# Patient Record
Sex: FEMALE | Race: WHITE | NOT HISPANIC OR LATINO | Employment: OTHER | URBAN - METROPOLITAN AREA
[De-identification: names, ages, dates, MRNs, and addresses within clinical notes are randomized per-mention and may not be internally consistent; named-entity substitution may affect disease eponyms.]

---

## 2017-03-07 ENCOUNTER — OFFICE VISIT (OUTPATIENT)
Dept: CARDIOLOGY | Facility: HOSPITAL | Age: 22
End: 2017-03-07

## 2017-03-07 ENCOUNTER — APPOINTMENT (OUTPATIENT)
Dept: CARDIOLOGY | Facility: HOSPITAL | Age: 22
End: 2017-03-07

## 2017-03-07 ENCOUNTER — PROCEDURE VISIT (OUTPATIENT)
Dept: CARDIOLOGY | Facility: HOSPITAL | Age: 22
End: 2017-03-07

## 2017-03-07 VITALS
HEART RATE: 86 BPM | WEIGHT: 98.6 LBS | SYSTOLIC BLOOD PRESSURE: 104 MMHG | RESPIRATION RATE: 18 BRPM | HEIGHT: 61 IN | TEMPERATURE: 98.4 F | DIASTOLIC BLOOD PRESSURE: 69 MMHG | BODY MASS INDEX: 18.61 KG/M2 | OXYGEN SATURATION: 100 %

## 2017-03-07 DIAGNOSIS — R00.2 PALPITATIONS: ICD-10-CM

## 2017-03-07 DIAGNOSIS — R55 SYNCOPE AND COLLAPSE: ICD-10-CM

## 2017-03-07 DIAGNOSIS — R00.2 PALPITATIONS: Primary | ICD-10-CM

## 2017-03-07 PROCEDURE — 93005 ELECTROCARDIOGRAM TRACING: CPT

## 2017-03-07 PROCEDURE — 99215 OFFICE O/P EST HI 40 MIN: CPT | Performed by: NURSE PRACTITIONER

## 2017-03-07 PROCEDURE — 93010 ELECTROCARDIOGRAM REPORT: CPT | Performed by: INTERNAL MEDICINE

## 2017-03-07 NOTE — PROGRESS NOTES
"  Encounter Date: 03/07/2017    Patient ID: Monae BURRELL is a 21 y.o. female    Chief Complaint: Establish Care (palps, SOB)     HPI :    This is a delightful white female who is self-referred to the clinic.  She comes in today relating a very long history of palpitations.  She has been evaluated at the [facility] and has also seen Dr. Astudillo for workup of her atrial fibrillation.  She has has been on beta blockers for 9 years.  She tells me that her last event monitor showed pauses greater than 4 seconds.  She has never been told that she has any arrhythmia.  There is no family history of sudden cardiac death or arrhythmias.    When the palpitation start basophilic a hard pounding that she can feel in her head and in her ears.  When the rate gets high (she does not clock the fast rate) she has chest pain with it.  It resolves once the rate goes down.  There are no precipitating or alleviating factors.  It occurs randomly.  Notably she has had 10 episodes of syncope where she has lost consciousness in the past 2 years.  Sometimes she checks her heart rate and it is slow as 34 bpm.  She feels very tired, dizzy and weak when her rate gets low.    She tell me she was diagnosed with mitral valve prolapse when she was younger but her last echocardiogram [facility] October 2016 was \"fine\".  She does have a history of asthma, otherwise is healthy.       Past Medical History   Diagnosis Date   • Asthma    • Insomnia    • Irregular heart beat          Past Surgical History   Procedure Laterality Date   • Tonsillectomy and adenoidectomy     • [place] tooth extraction     • Lymphadenectomy         Social History     Social History   • Marital status: Single     Spouse name: N/A   • Number of children: 0   • Years of education: N/A     Occupational History   • Not on file.     Social History Main Topics   • Smoking status: Never Smoker   • Smokeless tobacco: Never Used   • Alcohol use No   • Drug " use: No   • Sexual activity: Not on file     Other Topics Concern   • Not on file     Social History Narrative    Patient consumes 1 servings caffeine daily.     Patient lives at home with .            Family History   Problem Relation Age of Onset   • Thyroid disease Mother    • Coronary artery disease Father    • No Known Problems Brother    • Heart disease Maternal Grandmother    • Aortic aneurysm Maternal Grandfather    • Dementia Paternal Grandmother    • COPD Paternal Grandmother    • Heart disease Paternal Grandfather    • No Known Problems Brother        Review of Systems:  Review of Systems   Constitution: Positive for weakness and malaise/fatigue. Negative for chills, decreased appetite, diaphoresis, fever, night sweats, weight gain and weight loss.   HENT: Positive for headaches. Negative for congestion and nosebleeds.    Eyes: Negative for blurred vision, visual disturbance and visual halos.   Cardiovascular: Positive for chest pain, dyspnea on exertion, irregular heartbeat, leg swelling, near-syncope, orthopnea and palpitations. Negative for claudication, cyanosis, paroxysmal nocturnal dyspnea and syncope.   Respiratory: Positive for shortness of breath and wheezing. Negative for cough, hemoptysis, sleep disturbances due to breathing, snoring and sputum production.    Endocrine: Negative for cold intolerance, heat intolerance, polydipsia, polyphagia and polyuria.   Hematologic/Lymphatic: Bruises/bleeds easily.   Skin: Negative for dry skin, itching and rash.   Musculoskeletal: Positive for arthritis, falls, joint pain, joint swelling and muscle weakness. Negative for myalgias.   Gastrointestinal: Negative for bloating, abdominal pain, constipation, diarrhea, dysphagia, heartburn, melena, nausea and vomiting.   Genitourinary: Negative for dysuria, flank pain, hematuria and nocturia.   Neurological: Positive for difficulty with concentration, excessive daytime sleepiness, dizziness,  "light-headedness and loss of balance.   Psychiatric/Behavioral: Negative for altered mental status and depression. The patient has insomnia. The patient is not nervous/anxious.    Allergic/Immunologic: Positive for environmental allergies.       Objective:  Vitals:    03/07/17 1013 03/07/17 1014 03/07/17 1015   BP: 110/60 107/69 104/69   BP Location: Right arm Left arm Left arm   Patient Position: Sitting Sitting Standing   Pulse: 71  86   Resp: 18     Temp: 98.4 °F (36.9 °C)     TempSrc: Temporal Artery      SpO2: 100%     Weight: 98 lb 9.6 oz (44.7 kg)     Height: 61\" (154.9 cm)       Physical Exam   Constitutional: She is oriented to person, place, and time. She appears well-developed and well-nourished. No distress.   HENT:   Head: Normocephalic and atraumatic.   Mouth/Throat: Oropharynx is clear and moist. No oropharyngeal exudate.   Eyes: Conjunctivae are normal. Pupils are equal, round, and reactive to light. No scleral icterus.   Neck: No JVD present. No tracheal deviation present. No thyromegaly present.   Cardiovascular: Normal rate, regular rhythm, normal heart sounds and intact distal pulses.  Exam reveals no gallop and no friction rub.    No murmur heard.  Pulmonary/Chest: Effort normal and breath sounds normal. No respiratory distress. She has no wheezes. She has no rales.   Abdominal: Soft. Bowel sounds are normal. She exhibits no distension.   Musculoskeletal: She exhibits no edema.   Neurological: She is alert and oriented to person, place, and time.   Skin: Skin is warm and dry.   Psychiatric: She has a normal mood and affect.   :    Current Outpatient Prescriptions:   •  ALPRAZolam (XANAX) 0.25 MG tablet, TAKE 1-2 TABLETS BY MOUTH AT BEDTIME, Disp: , Rfl: 0  •  ALPRAZolam (XANAX) 0.5 MG tablet, Take 1 mg by mouth every night., Disp: , Rfl:   •  beclomethasone (QVAR) 80 MCG/ACT inhaler, Inhale 1 puff 2 (two) times a day., Disp: , Rfl:   •  bisoprolol (ZEBETA) 5 MG tablet, Take 5 mg by mouth daily., " Disp: , Rfl:   •  escitalopram (LEXAPRO) 5 MG tablet, Take 5 mg by mouth daily., Disp: , Rfl:   •  MethylPREDNISolone (MEDROL, EMMY,) 4 MG tablet, Take  by mouth 2 (two) times a day. Take as directed on package instructions., Disp: , Rfl:   •  montelukast (SINGULAIR) 10 MG tablet, Take 10 mg by mouth every night., Disp: , Rfl:   •  Norethin-Eth Estrad-Fe Biphas 1 MG-10 MCG / 10 MCG tablet, Take 1 tablet by mouth Daily., Disp: 28 tablet, Rfl: 12  •  QVAR 40 MCG/ACT inhaler, , Disp: , Rfl: 0     Lab/Diagnostic Studies:  EKG in the office shows sinus bradycardia with sinus arrhythmia.  Rate is 57 bpm; TX interval 132 ms; QRS duration 80 ms;  ms.  Final analysis is pending.    The patient had labs drawn at her PCPs office last week.  We have requested those records.      Please see media for scanned in event monitorsfrom 2014 in 2015    Assessment:    Palpitations dating back to 9 years ago    Syncope (10 times in 2 years):  -Brief review of event monitor from October 2014 shows at least 2 pauses.  The longest one I saw was 4.3 seconds.  I saw another one that was greater than 3 seconds.      Asthma, well-controlled     Plan:    -We will put a one-week ZI0 monitor on the patient  -We will obtain records from   -we will get her most recent labs from her PCP  -I have made a referral for her to see Dr. Go      Discussion:      *Please note that portions of this documentation may have been completed with a voice recognition program.  Efforts were made to edit this dictation, but occasional words may have been mistranscribed.

## 2017-03-10 ENCOUNTER — TELEPHONE (OUTPATIENT)
Dept: CARDIOLOGY | Facility: HOSPITAL | Age: 22
End: 2017-03-10

## 2017-03-10 NOTE — TELEPHONE ENCOUNTER
----- Message from DEVORA Newton sent at 3/10/2017  1:30 PM EST -----  Yes.  Please call her and tell her so.  ----- Message -----     From: Donna uBrns MA     Sent: 3/10/2017  11:23 AM       To: DEVORA Newton    Pt calling to see if you want her to stop taking her beta blocker, Zebeta, while wearing the monitor.

## 2017-03-15 DIAGNOSIS — I48.0 PAROXYSMAL ATRIAL FIBRILLATION (HCC): Primary | ICD-10-CM

## 2017-03-21 PROCEDURE — 93272 ECG/REVIEW INTERPRET ONLY: CPT | Performed by: INTERNAL MEDICINE

## 2017-03-27 ENCOUNTER — OFFICE VISIT (OUTPATIENT)
Dept: CARDIOLOGY | Facility: CLINIC | Age: 22
End: 2017-03-27

## 2017-03-27 DIAGNOSIS — R00.2 PALPITATIONS: ICD-10-CM

## 2017-05-03 ENCOUNTER — CONSULT (OUTPATIENT)
Dept: CARDIOLOGY | Facility: CLINIC | Age: 22
End: 2017-05-03

## 2017-05-03 VITALS
WEIGHT: 100 LBS | DIASTOLIC BLOOD PRESSURE: 64 MMHG | BODY MASS INDEX: 18.88 KG/M2 | HEART RATE: 87 BPM | SYSTOLIC BLOOD PRESSURE: 100 MMHG | HEIGHT: 61 IN

## 2017-05-03 DIAGNOSIS — R55 VASOVAGAL SYNCOPE: Primary | ICD-10-CM

## 2017-05-03 DIAGNOSIS — R00.2 PALPITATIONS: ICD-10-CM

## 2017-05-03 DIAGNOSIS — R00.0 SINUS TACHYCARDIA: ICD-10-CM

## 2017-05-03 PROCEDURE — 99204 OFFICE O/P NEW MOD 45 MIN: CPT | Performed by: INTERNAL MEDICINE

## 2017-05-03 PROCEDURE — 93000 ELECTROCARDIOGRAM COMPLETE: CPT | Performed by: INTERNAL MEDICINE

## 2017-05-03 RX ORDER — ALBUTEROL SULFATE 90 UG/1
2 AEROSOL, METERED RESPIRATORY (INHALATION) AS NEEDED
COMMUNITY

## 2017-05-03 RX ORDER — AMITRIPTYLINE HYDROCHLORIDE 10 MG/1
10 TABLET, FILM COATED ORAL 2 TIMES DAILY
Refills: 0 | COMMUNITY
Start: 2017-04-24 | End: 2021-10-01

## 2017-05-10 ENCOUNTER — APPOINTMENT (OUTPATIENT)
Dept: CARDIOLOGY | Facility: HOSPITAL | Age: 22
End: 2017-05-10
Attending: INTERNAL MEDICINE

## 2017-07-26 ENCOUNTER — OFFICE VISIT (OUTPATIENT)
Dept: CARDIOLOGY | Facility: CLINIC | Age: 22
End: 2017-07-26

## 2017-07-26 VITALS
HEIGHT: 61 IN | WEIGHT: 103.4 LBS | SYSTOLIC BLOOD PRESSURE: 106 MMHG | BODY MASS INDEX: 19.52 KG/M2 | DIASTOLIC BLOOD PRESSURE: 60 MMHG | HEART RATE: 72 BPM

## 2017-07-26 DIAGNOSIS — R55 VASOVAGAL SYNCOPE: Primary | ICD-10-CM

## 2017-07-26 DIAGNOSIS — R00.0 SINUS TACHYCARDIA: ICD-10-CM

## 2017-07-26 PROCEDURE — 99213 OFFICE O/P EST LOW 20 MIN: CPT | Performed by: INTERNAL MEDICINE

## 2017-07-26 RX ORDER — BISOPROLOL FUMARATE 5 MG/1
2.5 TABLET, FILM COATED ORAL 2 TIMES DAILY
Qty: 30 TABLET | Refills: 6 | Status: SHIPPED | OUTPATIENT
Start: 2017-07-26 | End: 2021-10-01

## 2017-07-26 NOTE — PROGRESS NOTES
Monae May  1995  [phone]      07/26/2017    [facility] CARDIOLOGY     A. Luis M Puckett II, MD MPH  [address]  [place] [id number]    Chief Complaint   Patient presents with   • Syncope       Problem List:   1)Syncope  2)Palpitations  A) [employer] Heart Monitor- 15 day monitoring period March 2017- . NSR with ST, no atrial fibrillation or SVT. Pause of 3.3 sec at   B) 10/31/2016 Echo: IVSD 0.65, LVPW 0.64, LA 2.4 cm EF>55%, mild MR, no MVP   2) Asthma   3) Insomnia  4) Surgical History- tonsillectomy and adenoidectomy, wisdom tooth extraction, lymphadenectomy     Allergies  Allergies   Allergen Reactions   • Ceftin [Cefuroxime Axetil] Nausea And Vomiting       Current Medications    Current Outpatient Prescriptions:   •  albuterol (VENTOLIN HFA) 108 (90 BASE) MCG/ACT inhaler, Inhale 2 puffs As Needed for Wheezing., Disp: , Rfl:   •  ALPRAZolam (XANAX) 0.25 MG tablet, TAKE 1-2 TABLETS BY MOUTH AT BEDTIME, Disp: , Rfl: 0  •  amitriptyline (ELAVIL) 10 MG tablet, Take 10 mg by mouth 2 (Two) Times a Day., Disp: , Rfl: 0  •  bisoprolol (ZEBETA) 5 MG tablet, Take 2.5 mg by mouth 2 (Two) Times a Day., Disp: , Rfl:   •  montelukast (SINGULAIR) 10 MG tablet, Take 10 mg by mouth every night., Disp: , Rfl:   •  Norethin-Eth Estrad-Fe Biphas 1 MG-10 MCG / 10 MCG tablet, Take 1 tablet by mouth Daily., Disp: 28 tablet, Rfl: 12  •  QVAR 40 MCG/ACT inhaler, Inhale 2 puffs Daily., Disp: , Rfl: 0    History of Present Illness   HPI    Pt presents for follow up of syncope. Since we last saw the pt, she did not have the tilt table test done due to inability to afford. She is having the same symptoms of blacking out several times, sometimes every other day, sometimes every other week. She has been compliant with hydrating and exercising. She has not passed all the way out, just blacks out momentarily. She has cut back her caffeine to one every other day which has not helped.  "She has not been checking her HRs or BP at home. She has tried Zoloft in the past, and it did not make a difference. She thinks her Elavil is not really helping her to fall asleep, but when she does fall asleep, she feels sleepy the rest of the day.     ROS:  General: -fatigue,+ weight gain - or loss  Cardiovascular:  Denies CP, PND,- syncope,+ near syncope, edema + palpitations.  Pulmonary:  Denies PERKINS, cough, or wheezing      Vitals:    07/26/17 1549   BP: 106/60   BP Location: Right arm   Patient Position: Sitting   Pulse: 72   Weight: 103 lb 6.4 oz (46.9 kg)   Height: 61\" (154.9 cm)     PE:  General: NAD  Neck: no JVD, no carotid bruits, no TM  Heart RRR, NL S1, S2, S4 present, no rubs, murmurs  Lungs: CTA, no wheezes, rhonchi, or rales  Abd: soft, non-tender, NL BS  Ext: No musculoskeletal deformities, no edema, cyanosis, or clubbing  Psych: normal mood and affect    Diagnostic Data:      Procedures    1. Vasovagal syncope    2. Sinus tachycardia          Plan:  1) Syncope- probably vaso-vagal. She could not afford Tilt table. She prefers to not increase her Elavil as it makes her tired. Did not have any benefit from Zoloft in the past. Will try to talk with her insurance company about covering the tilt table test in order to specify treatment. Consider [facility].   2. ST- controlled on Zebeta    Scribed for Zhou Go MD by Roxana Calderón PA-C. 7/26/2017  4:21 PM      I, Zhou Go MD, personally performed the services described in this documentation as scribed by the above named individual in my presence, and it is both accurate and complete.  7/26/2017  5:07 PM  "

## 2017-08-24 ENCOUNTER — TELEPHONE (OUTPATIENT)
Dept: CARDIOLOGY | Facility: CLINIC | Age: 22
End: 2017-08-24

## 2017-08-24 NOTE — TELEPHONE ENCOUNTER
Spoke with patient about starting Northera. Patient is going to research medication and call us back if she wants to start.

## 2017-08-24 NOTE — TELEPHONE ENCOUNTER
----- Message from Zhou Go MD sent at 8/14/2017  8:17 PM EDT -----  Regarding: FW: Visit Follow-Up Question  Contact: [phone]  It sounds like the next step would be northera.  Please arrange    GT  ----- Message -----     From: Tam Correia RN     Sent: 8/7/2017   2:42 PM       To: Zhou Go MD  Subject: FW: Visit Follow-Up Question                     Dr Go,    Patient is still unable to afford the cost of Tilt Table test. She reports that her symptoms have not improved. Patient is inquiring what you suggest she do next since she is not able to have test done.    Tam    ----- Message -----     From: Monae May     Sent: 8/3/2017  11:22 AM       To: [facility]  Subject: RE: Visit Follow-Up Question                     No ma'm, no improvement. Last time I saw him we didn't really go over much other than he wanted me to do the test.  ----- Message -----  From: AISHWARYA MANE  Sent: 8/2/2017 10:15 AM EDT  To: Monae May  Subject: RE: Visit Follow-Up Question    Monae,    I will inform Dr Go that you are unable to do the Tilt Table Test due to insurance/cost issues. How have your symptoms been? Any improvement since you saw him last?     AISHWARYA Turcios       ----- Message -----     From: Monae May     Sent: 8/1/2017  4:49 PM EDT       To: Zhou Go MD  Subject: Visit Follow-Up Question    I spoke with the scheduling department and my insurance and it will still cost me too much for me to be able to do the test. I did not schedule the test so I am not sure what you want me to do at this point.    Thank you,   Monae May   [phone]

## 2017-09-19 ENCOUNTER — OFFICE VISIT (OUTPATIENT)
Dept: RETAIL CLINIC | Facility: CLINIC | Age: 22
End: 2017-09-19

## 2017-09-19 VITALS
HEART RATE: 99 BPM | WEIGHT: 104 LBS | OXYGEN SATURATION: 99 % | RESPIRATION RATE: 20 BRPM | HEIGHT: 61 IN | BODY MASS INDEX: 19.63 KG/M2 | TEMPERATURE: 98.5 F

## 2017-09-19 DIAGNOSIS — J30.89 ALLERGIC RHINITIS DUE TO OTHER ALLERGIC TRIGGER: ICD-10-CM

## 2017-09-19 DIAGNOSIS — J01.90 ACUTE SINUSITIS, RECURRENCE NOT SPECIFIED, UNSPECIFIED LOCATION: Primary | ICD-10-CM

## 2017-09-19 PROCEDURE — 99213 OFFICE O/P EST LOW 20 MIN: CPT | Performed by: NURSE PRACTITIONER

## 2017-09-19 RX ORDER — AMOXICILLIN AND CLAVULANATE POTASSIUM 875; 125 MG/1; MG/1
1 TABLET, FILM COATED ORAL 2 TIMES DAILY
Qty: 14 TABLET | Refills: 0 | Status: SHIPPED | OUTPATIENT
Start: 2017-09-19 | End: 2017-09-22 | Stop reason: SINTOL

## 2017-09-19 NOTE — PROGRESS NOTES
Subjective   Monae May is a 22 y.o. female presents for possible sinus infection.  Patient states has ongoing allergies but thinks has turned to sinus infection.  States worse in last 2 days.  States has been taking xyzal and Ibuprofen with no relief.    Sinusitis   This is a new problem. The current episode started in the past 7 days. The problem has been rapidly worsening since onset. There has been no fever. Her pain is at a severity of 7/10. The pain is moderate. Associated symptoms include chills, congestion, coughing (dry cough), ear pain (bilat ear pressure, right > left), headaches, sinus pressure and a sore throat (mild). Pertinent negatives include no hoarse voice, neck pain or shortness of breath. Treatments tried: xyzal and ibuprofen. The treatment provided mild relief.        The following portions of the patient's history were reviewed and updated as appropriate: allergies, current medications, past family history, past medical history, past social history and past surgical history.    Review of Systems   Constitutional: Positive for chills and fever (states feels hot but has not cked temp). Negative for unexpected weight change.   HENT: Positive for congestion, ear pain (bilat ear pressure, right > left), postnasal drip, sinus pressure and sore throat (mild). Negative for hoarse voice, trouble swallowing and voice change.    Eyes: Negative.    Respiratory: Positive for cough (dry cough). Negative for chest tightness, shortness of breath and wheezing.    Cardiovascular: Negative.    Gastrointestinal: Negative.    Genitourinary: Negative.    Musculoskeletal: Negative.  Negative for neck pain.   Skin: Negative.    Allergic/Immunologic: Positive for environmental allergies.   Neurological: Positive for headaches.       Objective   Physical Exam   Constitutional: She is oriented to person, place, and time. She appears well-developed and well-nourished. No distress.   HENT:   Head: Normocephalic and  atraumatic.   Right Ear: Hearing and ear canal normal. No drainage or tenderness. Tympanic membrane is not erythematous. A middle ear effusion is present.   Left Ear: Hearing and ear canal normal. No drainage or tenderness. Tympanic membrane is not erythematous. A middle ear effusion is present.   Nose: Mucosal edema and sinus tenderness present. Right sinus exhibits maxillary sinus tenderness and frontal sinus tenderness. Left sinus exhibits maxillary sinus tenderness and frontal sinus tenderness.   Mouth/Throat: Uvula is midline and mucous membranes are normal. Posterior oropharyngeal erythema present. Tonsils are 0 on the right. Tonsils are 0 on the left. No tonsillar exudate.   Eyes: Conjunctivae and lids are normal. Pupils are equal, round, and reactive to light.   Neck: Normal range of motion.   Cardiovascular: Normal rate, regular rhythm and normal heart sounds.    No murmur heard.  Pulmonary/Chest: Effort normal and breath sounds normal. No respiratory distress. She has no wheezes.   Lymphadenopathy:     She has cervical adenopathy (bilateral anterior cervical).   Neurological: She is alert and oriented to person, place, and time.   Skin: Skin is warm and dry. No rash noted.   Psychiatric: She has a normal mood and affect. Her speech is normal and behavior is normal. Thought content normal. Cognition and memory are normal.       Assessment/Plan   Monae was seen today for sinusitis.    Diagnoses and all orders for this visit:    Acute sinusitis, recurrence not specified, unspecified location  Allergic rhinitis due to other allergic trigger  -     amoxicillin-clavulanate (AUGMENTIN) 875-125 MG per tablet; Take 1 tablet by mouth 2 (Two) Times a Day for 7 days.    Medications and plan of care reviewed with patient and teaching done on medication reactions/side effects. Continue Xyzal as prescribed and use saline sprays as discussed.  Rest, plenty of fluids, comfort measures, humidifier, warm salt water gargles,  and follow up with PCP if symptoms persist.  If worse go to urgent care or ER as discussed.  Patient verbalized understanding.     DEVORA Yao

## 2017-09-19 NOTE — PATIENT INSTRUCTIONS
Nasal Allergies  Nasal allergies are a reaction to allergens in the air. Allergens are particles in the air that cause your body to have an allergic reaction. Nasal allergies are not passed from person to person (are not contagious). They cannot be cured, but they can be controlled.  CAUSES  Seasonal nasal allergies (hay fever) are caused by pollen allergens that come from grasses, trees, and weeds. Year-round nasal allergies (perennial allergic rhinitis) are caused by allergens such as house dust mites, pet dander, and mold spores.  RISK FACTORS  The following factors may make you more likely to develop this condition:  · Having certain health conditions. These include:    Other types of allergies, such as food allergies.    Asthma.    Eczema.  · Having a close relative who has allergies or asthma.  · Exposure to house dust, pollen, dander, or other allergens at home or at work.  · Exposure to air pollution or secondhand smoke when you were a child.  SYMPTOMS  Symptoms of this condition include:  · Sneezing.  · Runny nose or stuffy nose (congestion).  · Watery (tearing) eyes.  · Itchy eyes, nose, mouth, throat, skin, or other area.  · Sore throat.  · Headache.  · Decreased sense of smell or taste.  · Fatigue. This may occur if you have trouble sleeping due to allergies.  · Swollen eyelids.  DIAGNOSIS  This condition is diagnosed with a medical history and physical exam. Allergy testing may be done to determine exactly what triggers your nasal allergies.  TREATMENT  There is no cure for nasal allergies. Treatment focuses on controlling your symptoms, and it may include:  · Medicines that block allergy symptoms. These may include allergy shots, nasal sprays, and oral antihistamines.  · Avoiding the allergen.  HOME CARE INSTRUCTIONS  · Avoid the allergen that is causing your symptoms, if possible.  · Keep windows closed. If possible, use air conditioning when pollen counts are high.  · Do not use fans in your  home.  · Do not hang clothes outside to dry.  · Wear sunglasses to keep pollen out of your eyes.  · Wash your hands right away after you touch household pets.  · Take over-the-counter and prescription medicines only as told by your health care provider.  · Keep all follow-up visits as told by your health care provider. This is important.  SEEK MEDICAL CARE IF:  · You have a fever.  · You develop a cough that does not go away (is persistent).  · You start to wheeze.  · Your symptoms do not improve with treatment.  · You have thick nasal discharge.  · You start to have nosebleeds.  SEEK IMMEDIATE MEDICAL CARE IF:  · Your tongue or your lips are swollen.  · You have trouble breathing.  · You feel light-headed or you feel like you are going to faint.  · You have cold sweats.     This information is not intended to replace advice given to you by your health care provider. Make sure you discuss any questions you have with your health care provider.     Document Released: 12/18/2006 Document Revised: 04/10/2017 Document Reviewed: 06/29/2016  [employer] Interactive Patient Education ©2017 [employer] Inc.  Sinusitis, Adult  Sinusitis is soreness and inflammation of your sinuses. Sinuses are hollow spaces in the bones around your face. They are located:  · Around your eyes.  · In the middle of your forehead.  · Behind your nose.  · In your cheekbones.  Your sinuses and nasal passages are lined with a stringy fluid (mucus). Mucus normally drains out of your sinuses. When your nasal tissues get inflamed or swollen, the mucus can get trapped or blocked so air cannot flow through your sinuses. This lets bacteria, viruses, and funguses grow, and that leads to infection.  HOME CARE  Medicines  · Take, use, or apply over-the-counter and prescription medicines only as told by your doctor. These may include nasal sprays.  · If you were prescribed an antibiotic medicine, take it as told by your doctor. Do not stop taking the antibiotic  even if you start to feel better.  Hydrate and Humidify  · Drink enough water to keep your pee (urine) clear or pale yellow.  · Use a cool mist humidifier to keep the humidity level in your home above 50%.  · Breathe in steam for 10-15 minutes, 3-4 times a day or as told by your doctor. You can do this in the bathroom while a hot shower is running.  · Try not to spend time in cool or dry air.  Rest  · Rest as much as possible.    · Sleep with your head raised (elevated).  · Make sure to get enough sleep each night.  General Instructions  · Put a warm, moist washcloth on your face 3-4 times a day or as told by your doctor. This will help with discomfort.  · Wash your hands often with soap and water. If there is no soap and water, use hand .  · Do not smoke. Avoid being around people who are smoking (secondhand smoke).  · Keep all follow-up visits as told by your doctor. This is important.  GET HELP IF:  · You have a fever.  · Your symptoms get worse.  · Your symptoms do not get better within 10 days.  GET HELP RIGHT AWAY IF:  · You have a very bad headache.  · You cannot stop throwing up (vomiting).  · You have pain or swelling around your face or eyes.  · You have trouble seeing.  · You feel confused.  · Your neck is stiff.  · You have trouble breathing.     This information is not intended to replace advice given to you by your health care provider. Make sure you discuss any questions you have with your health care provider.    Medications and plan of care reviewed with patient and teaching done on medication reactions/side effects. Continue Xyzal as prescribed and use saline sprays as discussed.  Rest, plenty of fluids, comfort measures, humidifier, warm salt water gargles, and follow up with PCP if symptoms persist.  If worse go to urgent care or ER as discussed.     Document Released: 06/05/2009 Document Revised: 04/10/2017 Document Reviewed: 10/12/2016  [employer] Interactive Patient Education ©2017  [employer] Inc.

## 2017-09-22 RX ORDER — DOXYCYCLINE 100 MG/1
100 TABLET ORAL 2 TIMES DAILY
Qty: 14 TABLET | Refills: 0 | Status: SHIPPED | OUTPATIENT
Start: 2017-09-22 | End: 2017-09-29

## 2017-09-22 NOTE — PROGRESS NOTES
Patient called and stated that Augmentin was causing her abdominal cramping and diarrhea. States diarrhea started last night.  States has had 2 episodes today. States took one dose of antibiotic today.  Requested different antibiotic.  Instructed to stop Augmentin.  Order for doxycycline sent in to pharmacy.  Teaching done with patient on side effects/med reactions.  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  Instructed to follow up with PCP if further or continued problems.  Patient verbalized understanding.    DEVORA Yao

## 2017-09-25 RX ORDER — NORETHINDRONE ACETATE AND ETHINYL ESTRADIOL AND FERROUS FUMARATE 1MG-20(24)
1 KIT ORAL DAILY
Qty: 28 TABLET | Refills: 12 | Status: SHIPPED | OUTPATIENT
Start: 2017-09-25 | End: 2018-08-01 | Stop reason: SDUPTHER

## 2017-09-26 ENCOUNTER — TELEPHONE (OUTPATIENT)
Dept: CARDIOLOGY | Facility: CLINIC | Age: 22
End: 2017-09-26

## 2017-09-26 NOTE — TELEPHONE ENCOUNTER
I tried to call the patient back. No answer. I left a message. She was supposed to call us back and let us know if she wanted to start the medication or not per the telephone encounter on 8/24/17. I also needed more information in regards to her email that she sent as well.

## 2017-09-27 ENCOUNTER — TELEPHONE (OUTPATIENT)
Dept: CARDIOLOGY | Facility: CLINIC | Age: 22
End: 2017-09-27

## 2017-09-28 ENCOUNTER — TELEPHONE (OUTPATIENT)
Dept: CARDIOLOGY | Facility: CLINIC | Age: 22
End: 2017-09-28

## 2017-09-28 NOTE — TELEPHONE ENCOUNTER
Called pt in regards to her email. Filled out Northera paperwork awaiting pt signature and [facility] signature to fax paperwork in to [employer].

## 2017-10-16 ENCOUNTER — TELEPHONE (OUTPATIENT)
Dept: OBSTETRICS AND GYNECOLOGY | Facility: CLINIC | Age: 22
End: 2017-10-16

## 2017-10-16 RX ORDER — FLUCONAZOLE 150 MG/1
150 TABLET ORAL DAILY
Qty: 1 TABLET | Refills: 1 | Status: SHIPPED | OUTPATIENT
Start: 2017-10-16 | End: 2018-08-01

## 2017-10-16 NOTE — TELEPHONE ENCOUNTER
Pt. Called complaining of having a yeast infection and would like Diflucan called in, it has previously worked in the past.

## 2017-10-30 ENCOUNTER — TELEPHONE (OUTPATIENT)
Dept: CARDIOLOGY | Facility: CLINIC | Age: 22
End: 2017-10-30

## 2017-10-30 NOTE — TELEPHONE ENCOUNTER
Patient just wanted to know if her insurance company had approved Northera yet. She said that she had already spoken with [employer] and they told her it had been approved. I explained to her that we have not received anything yet, but I will let her know when we do.

## 2017-12-08 ENCOUNTER — TELEPHONE (OUTPATIENT)
Dept: CARDIOLOGY | Facility: CLINIC | Age: 22
End: 2017-12-08

## 2017-12-08 NOTE — TELEPHONE ENCOUNTER
[facility] called to let us know we need to do PA on Northera by calling [phone].  I called and it was [facility], they stated that [facility] could not do the PA for us because they did not speak to 3rd parties. They asked for us to send in [facility] nonformulary medication request (which is already complete) and clinicals for Dr. Montes De Oca to review to [phone].  Pt was recently  and they reminded us that if so she would not be able to stay on her father's plan and that her insurance would likely change and we would have to repeat process.  I called and left message with pt to discuss if we want to try now or just go ahead and start in Jan.

## 2017-12-26 ENCOUNTER — DOCUMENTATION (OUTPATIENT)
Dept: CARDIOLOGY | Facility: CLINIC | Age: 22
End: 2017-12-26

## 2018-01-05 LAB — TOAL ENROLLMENT DAYS: 15

## 2018-02-15 ENCOUNTER — TELEPHONE (OUTPATIENT)
Dept: OBSTETRICS AND GYNECOLOGY | Facility: CLINIC | Age: 23
End: 2018-02-15

## 2018-02-15 RX ORDER — NORETHINDRONE ACETATE AND ETHINYL ESTRADIOL AND FERROUS FUMARATE 1MG-20(24)
1 KIT ORAL DAILY
Qty: 28 TABLET | Refills: 12 | Status: SHIPPED | OUTPATIENT
Start: 2018-02-15 | End: 2018-08-01 | Stop reason: SDUPTHER

## 2018-02-15 NOTE — TELEPHONE ENCOUNTER
A shouldn't called and a prescription for Loestrin 24 FE was sent to her pharmacy.    Dani Arango MD

## 2018-02-20 ENCOUNTER — CLINICAL SUPPORT (OUTPATIENT)
Dept: CARDIOLOGY | Facility: CLINIC | Age: 23
End: 2018-02-20

## 2018-02-20 ENCOUNTER — TELEPHONE (OUTPATIENT)
Dept: CARDIOLOGY | Facility: CLINIC | Age: 23
End: 2018-02-20

## 2018-02-20 DIAGNOSIS — R00.0 SINUS TACHYCARDIA: Primary | ICD-10-CM

## 2018-02-20 PROCEDURE — 93000 ELECTROCARDIOGRAM COMPLETE: CPT | Performed by: INTERNAL MEDICINE

## 2018-02-20 NOTE — TELEPHONE ENCOUNTER
Patient called to let us know that her heart rate feels like it may be irregular. She is coming in today for an EKG.She is currently on Northera. Her BP's have been 117/76, 123/74, 112/74 with HR's of 50-88.

## 2018-02-21 NOTE — PROGRESS NOTES
I came in to talk with pt, she was complaining of palpitations that happen a couple of hours before she is due to take her bisoprolol and states that it feels very irregular.  Dr. Go reviewed EKG, we will order 48 hours holter to check for any arrhythmias.

## 2018-03-27 ENCOUNTER — TELEPHONE (OUTPATIENT)
Dept: CARDIOLOGY | Facility: CLINIC | Age: 23
End: 2018-03-27

## 2018-03-28 RX ORDER — DROXIDOPA 300 MG/1
600 CAPSULE ORAL 3 TIMES DAILY
Qty: 90 CAPSULE
Start: 2018-03-28 | End: 2020-12-07

## 2018-03-28 NOTE — TELEPHONE ENCOUNTER
I spoke with pt, she does not think it was likely she was asleep at that time.  She is already on bisoprolol 2.5mg BID and is doing pretty well with the Northera at 600mg TID.  Do you want to change her BB dose?

## 2018-03-29 NOTE — TELEPHONE ENCOUNTER
I spoke with patient, she will decrease to 2.5mg qHS and will get a f/u scheduled, office scheduling notified.

## 2018-03-29 NOTE — TELEPHONE ENCOUNTER
I tried to call the patient. No answer. I left a message with this information.I instructed her to call us back to confirm that she received the message.

## 2018-08-01 ENCOUNTER — OFFICE VISIT (OUTPATIENT)
Dept: CARDIOLOGY | Facility: CLINIC | Age: 23
End: 2018-08-01

## 2018-08-01 VITALS
SYSTOLIC BLOOD PRESSURE: 110 MMHG | HEIGHT: 61 IN | WEIGHT: 100.2 LBS | HEART RATE: 99 BPM | DIASTOLIC BLOOD PRESSURE: 72 MMHG | BODY MASS INDEX: 18.92 KG/M2

## 2018-08-01 DIAGNOSIS — R55 VASOVAGAL SYNCOPE: Primary | ICD-10-CM

## 2018-08-01 DIAGNOSIS — R00.0 SINUS TACHYCARDIA: ICD-10-CM

## 2018-08-01 PROCEDURE — 99213 OFFICE O/P EST LOW 20 MIN: CPT | Performed by: INTERNAL MEDICINE

## 2019-02-25 ENCOUNTER — TELEPHONE (OUTPATIENT)
Dept: OBSTETRICS AND GYNECOLOGY | Facility: CLINIC | Age: 24
End: 2019-02-25

## 2019-02-25 RX ORDER — NORETHINDRONE ACETATE AND ETHINYL ESTRADIOL, ETHINYL ESTRADIOL AND FERROUS FUMARATE 1MG-10(24)
KIT ORAL
Qty: 168 TABLET | Refills: 0 | Status: SHIPPED | OUTPATIENT
Start: 2019-02-25 | End: 2019-02-25 | Stop reason: SDUPTHER

## 2019-02-25 NOTE — TELEPHONE ENCOUNTER
PATIENT NEEDS REFILL ON LO LOESTRIN PLEASE CALL INTO [facility] ON [address]. COULD BE L[facility]ED [facility]. CALL HER [phone] WITH ANY QUESTIONS.

## 2019-03-21 ENCOUNTER — OFFICE VISIT (OUTPATIENT)
Dept: OBSTETRICS AND GYNECOLOGY | Facility: CLINIC | Age: 24
End: 2019-03-21

## 2019-03-21 VITALS — SYSTOLIC BLOOD PRESSURE: 96 MMHG | BODY MASS INDEX: 20.44 KG/M2 | WEIGHT: 108.2 LBS | DIASTOLIC BLOOD PRESSURE: 60 MMHG

## 2019-03-21 DIAGNOSIS — Z01.419 WOMEN'S ANNUAL ROUTINE GYNECOLOGICAL EXAMINATION: Primary | ICD-10-CM

## 2019-03-21 DIAGNOSIS — Z30.41 ENCOUNTER FOR BIRTH CONTROL PILLS MAINTENANCE: ICD-10-CM

## 2019-03-21 PROCEDURE — 99395 PREV VISIT EST AGE 18-39: CPT | Performed by: OBSTETRICS & GYNECOLOGY

## 2019-03-21 RX ORDER — NORETHINDRONE ACETATE AND ETHINYL ESTRADIOL 1; .02 MG/1; MG/1
TABLET ORAL
COMMUNITY
End: 2019-03-21 | Stop reason: SDUPTHER

## 2019-03-21 NOTE — PROGRESS NOTES
Subjective   Chief Complaint   Patient presents with   • Annual Exam     no c/o     Monae May is a 23 y.o. year old No obstetric history on file. presenting to be seen for her annual exam.  Patient is on lo Loestrin birth control pills and wants refills for a year.  Her cycles are regular and she is having no problems.  The patient has no other GYN problems.    SEXUAL Hx:  She is currently sexually active.  In the past year there has not been new sexual partners.    Condoms are not typically used.  She would not like to be screened for STD's at today's exam.  Current birth control method: OCP (estrogen/progesterone).  She is happy with her current method of contraception and does not want to discuss alternative methods of contraception.  MENSTRUAL Hx:  Patient's last menstrual period was 02/21/2019 (within days).  In the past 6 months her cycles have been regular, predictable and occur monthly.  Her menstrual flow is typically light.   Each month on average there are roughly 1 day(s) of very heavy flow.    Intermenstrual bleeding is absent.    Post-coital bleeding is absent.  Dysmenorrhea: is not affecting her activities of daily living  PMS: is not affecting her activities of daily living  Her cycles are not a source of concern for her that she wishes to discuss today.  HEALTH Hx:  She exercises regularly:yes.  She wears her seat belt:yes.  She has concerns about domestic violence: no.  OTHER COMPLAINTS:  Nothing else    The following portions of the patient's history were reviewed and updated as appropriate:problem list, current medications, allergies, past family history, past medical history, past social history and past surgical history.    Social History    Tobacco Use      Smoking status: Never Smoker      Smokeless tobacco: Never Used    Review of Systems  Review of Systems - History obtained from the patient  General ROS: negative  Psychological ROS: negative  ENT ROS: negative  Allergy and Immunology  ROS: positive for - seasonal allergies  Hematological and Lymphatic ROS: negative  Endocrine ROS: negative  Breast ROS: negative for breast lumps  Respiratory ROS: no cough, shortness of breath, or wheezing  Cardiovascular ROS: positive for - irregular heartbeat  Gastrointestinal ROS: no abdominal pain, change in bowel habits, or black or bloody stools  Genito-Urinary ROS: no dysuria, trouble voiding, or hematuria  Musculoskeletal ROS: negative  Neurological ROS: no TIA or stroke symptoms  Dermatological ROS: negative        Objective   BP 96/60 (BP Location: Right arm, Patient Position: Sitting, Cuff Size: Adult)   Wt 49.1 kg (108 lb 3.2 oz)   LMP 02/21/2019 (Within Days)   Breastfeeding? No   BMI 20.44 kg/m²      General:  well developed; well nourished  no acute distress   Skin:  No suspicious lesions seen   Thyroid: normal to inspection and palpation   Breasts:  Examined in supine position  Symmetric without masses or skin dimpling  Nipples normal without inversion, lesions or discharge  There are no palpable axillary nodes   Abdomen: soft, non-tender; no masses  no umbilical or inguinal hernias are present  no hepato-splenomegaly   Heart: regular rate and rhythm, S1, S2 normal, no murmur, click, rub or gallop   Lungs: clear to auscultation   Pelvis: Clinical staff was present for exam  External genitalia:  normal appearance of the external genitalia including Bartholin's and [place]'s glands.  :  urethral meatus normal;  Vaginal:  normal pink mucosa without prolapse or lesions.  Cervix:  normal appearance. Pap smear was done  Uterus:  normal size, shape and consistency.  Adnexa:  normal bimanual exam of the adnexa.  Rectal:  digital rectal exam not performed; anus visually normal appearing.          Assessment/Plan   Monae was seen today for annual exam.    Diagnoses and all orders for this visit:    Women's annual routine gynecological examination  -     Liquid-based Pap Smear, Screening    Encounter for  birth control pills maintenance  -     Norethin-Eth Estrad-Fe Biphas (LO LOESTRIN FE) 1 MG-10 MCG / 10 MCG tablet; Take 1 tablet by mouth Daily.         Return in 1 year    This note was electronically signed.    Dani Arango MD   March 21, 2019

## 2019-07-29 ENCOUNTER — TELEPHONE (OUTPATIENT)
Dept: OBSTETRICS AND GYNECOLOGY | Facility: CLINIC | Age: 24
End: 2019-07-29

## 2019-07-29 DIAGNOSIS — Z30.41 ENCOUNTER FOR BIRTH CONTROL PILLS MAINTENANCE: ICD-10-CM

## 2019-07-29 NOTE — TELEPHONE ENCOUNTER
Dr. Arango patient:    Patient insurance requires 90-day refill on birth control not monthly. Needs 90-day refill prescription wrote.    New pharmacy is [facility] on [address].    Callback number is: [phone]

## 2020-04-14 ENCOUNTER — TELEPHONE (OUTPATIENT)
Dept: OBSTETRICS AND GYNECOLOGY | Facility: CLINIC | Age: 25
End: 2020-04-14

## 2020-04-14 DIAGNOSIS — Z30.41 ENCOUNTER FOR BIRTH CONTROL PILLS MAINTENANCE: ICD-10-CM

## 2020-04-14 NOTE — TELEPHONE ENCOUNTER
Patient requesting a refill on her birth control Lo Loestrin. Patient will need an appointment in June/July 2020. I will send in enough refills until her appointment. E-Rx Lo Loestrin was sent to patient's pharmacy [facility] on [address]

## 2020-04-14 NOTE — TELEPHONE ENCOUNTER
PATIENT CALLED TO REQUEST A REFILL ON HER BIRTH CONTROL, BERNIE MIGUEL TO BE CALLED IN TO THE [facility] ON [address].

## 2020-09-30 DIAGNOSIS — Z30.41 ENCOUNTER FOR BIRTH CONTROL PILLS MAINTENANCE: ICD-10-CM

## 2020-09-30 RX ORDER — NORETHINDRONE ACETATE AND ETHINYL ESTRADIOL, ETHINYL ESTRADIOL AND FERROUS FUMARATE 1MG-10(24)
KIT ORAL
Qty: 28 TABLET | Refills: 0 | Status: SHIPPED | OUTPATIENT
Start: 2020-09-30 | End: 2020-10-28

## 2020-09-30 NOTE — TELEPHONE ENCOUNTER
Last seen 3/21/2019 and per Dr. Arango's office note patient was to return in 1 year. I will send in one refill ad advise patient she will need an appointment for additional refills.

## 2020-10-28 DIAGNOSIS — Z30.41 ENCOUNTER FOR BIRTH CONTROL PILLS MAINTENANCE: ICD-10-CM

## 2020-10-28 RX ORDER — NORETHINDRONE ACETATE AND ETHINYL ESTRADIOL, ETHINYL ESTRADIOL AND FERROUS FUMARATE 1MG-10(24)
KIT ORAL
Qty: 28 TABLET | Refills: 1 | Status: SHIPPED | OUTPATIENT
Start: 2020-10-28 | End: 2020-11-30 | Stop reason: SDUPTHER

## 2020-11-30 DIAGNOSIS — Z30.41 ENCOUNTER FOR BIRTH CONTROL PILLS MAINTENANCE: ICD-10-CM

## 2020-11-30 RX ORDER — NORETHINDRONE ACETATE AND ETHINYL ESTRADIOL, ETHINYL ESTRADIOL AND FERROUS FUMARATE 1MG-10(24)
1 KIT ORAL DAILY
Qty: 84 TABLET | Refills: 0 | Status: SHIPPED | OUTPATIENT
Start: 2020-11-30 | End: 2020-12-07 | Stop reason: SDUPTHER

## 2020-11-30 NOTE — TELEPHONE ENCOUNTER
[phone] returned patient's call and advised patient a 30 day supply was sent to her pharmacy because she has an appointment 12/7/2020 and at the time of her appointment Dr. Arango will send in a 90 day supply with refills for a year. Patient states her pharmacy [facility] advised her that her insurance will not cover her Rx unless it's a 90 day supply. I advised patient I will go ahead and send over a 90 day supply and for her to make sure she keeps her appointment on 12/7/2020. Patient verbalized understanding. I sent a Rx for Lo Loestrin #84 no refills to [facility] on [address].

## 2020-12-07 ENCOUNTER — OFFICE VISIT (OUTPATIENT)
Dept: OBSTETRICS AND GYNECOLOGY | Facility: CLINIC | Age: 25
End: 2020-12-07

## 2020-12-07 VITALS — BODY MASS INDEX: 22.11 KG/M2 | WEIGHT: 117 LBS

## 2020-12-07 DIAGNOSIS — Z01.419 WELL WOMAN EXAM WITH ROUTINE GYNECOLOGICAL EXAM: Primary | ICD-10-CM

## 2020-12-07 DIAGNOSIS — Z30.41 ENCOUNTER FOR BIRTH CONTROL PILLS MAINTENANCE: ICD-10-CM

## 2020-12-07 PROCEDURE — 99395 PREV VISIT EST AGE 18-39: CPT | Performed by: OBSTETRICS & GYNECOLOGY

## 2020-12-07 RX ORDER — NORETHINDRONE ACETATE AND ETHINYL ESTRADIOL, ETHINYL ESTRADIOL AND FERROUS FUMARATE 1MG-10(24)
1 KIT ORAL DAILY
Qty: 84 TABLET | Refills: 12 | Status: SHIPPED | OUTPATIENT
Start: 2020-12-07 | End: 2021-12-10

## 2020-12-07 RX ORDER — TRAZODONE HYDROCHLORIDE 50 MG/1
TABLET ORAL
COMMUNITY
End: 2021-10-01

## 2020-12-07 NOTE — PROGRESS NOTES
Subjective   Chief Complaint   Patient presents with   • Gynecologic Exam     Patient is here today for her annual and pap smear, no complaints     Monae aMy is a 25 y.o. year old  presenting to be seen for her annual exam.  Patient presents for an annual exam to continue on birth control pills.  She had a Pap smear last year which was normal and wants to do this on a 3-year basis.  She is having no GYN problems.   Adult Visits - 19 to 39 Years - Counseling/Anticipatory Guidance: Nutrition, family planning/contraception, physical activity, healthy weight, injury prevention, misuse of tobacco, alcohol and drugs, sexual behavior and STDs, dental health, mental health, immunizations, screenings For Women: Breast cancer and self breast exams    SEXUAL Hx:  She is currently sexually active.  In the past year there has not been new sexual partners.    Condoms are not typically used.  She would not like to be screened for STD's at today's exam.  Current birth control method: OCP (estrogen/progesterone).  She is happy with her current method of contraception and does not want to discuss alternative methods of contraception.  MENSTRUAL Hx:  Patient's last menstrual period was 2020 (approximate).  In the past 6 months her cycles have been irregular.  Her menstrual flow is typically light.   Each month on average there are roughly 0 day(s) of very heavy flow.    Intermenstrual bleeding is absent.    Post-coital bleeding is absent.  Dysmenorrhea: none  PMS: none  Her cycles are not a source of concern for her that she wishes to discuss today.  HEALTH Hx:  She exercises regularly:no (and has no plans to become more active).  She wears her seat belt:yes.  She has concerns about domestic violence: no.  OTHER COMPLAINTS:  Nothing else    The following portions of the patient's history were reviewed and updated as appropriate:problem list, current medications, allergies, past family history, past medical history,  past social history and past surgical history.    Social History    Tobacco Use      Smoking status: Never Smoker      Smokeless tobacco: Never Used    Review of Systems  Review of Systems - History obtained from the patient  General ROS: negative  Psychological ROS: negative  ENT ROS: negative  Allergy and Immunology ROS: positive for - seasonal allergies  Hematological and Lymphatic ROS: negative  Endocrine ROS: negative  Breast ROS: negative for breast lumps  Respiratory ROS: no cough, shortness of breath, or wheezing  Cardiovascular ROS: positive for - irregular heartbeat  Gastrointestinal ROS: no abdominal pain, change in bowel habits, or black or bloody stools  Genito-Urinary ROS: no dysuria, trouble voiding, or hematuria  Musculoskeletal ROS: negative  Neurological ROS: no TIA or stroke symptoms  Dermatological ROS: negative        Objective   Wt 53.1 kg (117 lb)   LMP 11/23/2020 (Approximate)   Breastfeeding No   BMI 22.11 kg/m²     General:  well developed; well nourished  no acute distress   Skin:  No suspicious lesions seen   Thyroid: not examined   Breasts:  Examined in supine position  Symmetric without masses or skin dimpling  Nipples normal without inversion, lesions or discharge  There are no palpable axillary nodes   Abdomen: soft, non-tender; no masses  no umbilical or inguinal hernias are present  no hepato-splenomegaly   Heart: regular rate and rhythm, S1, S2 normal, no murmur, click, rub or gallop   Lungs: clear to auscultation   Pelvis: Clinical staff was present for exam  External genitalia:  normal appearance of the external genitalia including Bartholin's and [place]'s glands.  :  urethral meatus normal;  Vaginal:  normal pink mucosa without prolapse or lesions.  Cervix:  normal appearance.  Uterus:  normal size, shape and consistency. anteverted;  Adnexa:  normal bimanual exam of the adnexa.  Rectal:  digital rectal exam not performed; anus visually normal appearing.           Assessment/Plan   Diagnoses and all orders for this visit:    1. Well woman exam with routine gynecological exam (Primary)    2. Encounter for birth control pills maintenance  -     Norethin-Eth Estrad-Fe Biphas (Lo Loestrin Fe) 1 MG-10 MCG / 10 MCG tablet; Take 1 tablet by mouth Daily.  Dispense: 84 tablet; Refill: 12    Other orders  -     Cancel: Pap IG, Rfx HPV ASCU; Future    Return in 1 year       This note was electronically signed.    Dani Arango MD   December 7, 2020

## 2021-09-20 ENCOUNTER — HOSPITAL ENCOUNTER (OUTPATIENT)
Dept: GENERAL RADIOLOGY | Facility: HOSPITAL | Age: 26
Discharge: HOME | End: 2021-09-20
Admitting: NURSE PRACTITIONER

## 2021-09-20 ENCOUNTER — TRANSCRIBE ORDERS (OUTPATIENT)
Dept: ADMINISTRATIVE | Facility: HOSPITAL | Age: 26
End: 2021-09-20

## 2021-09-20 DIAGNOSIS — R07.89 CHEST PAIN, MID STERNAL: Primary | ICD-10-CM

## 2021-09-20 DIAGNOSIS — R10.84 ABDOMINAL PAIN, GENERALIZED: ICD-10-CM

## 2021-09-20 PROCEDURE — 74019 RADEX ABDOMEN 2 VIEWS: CPT

## 2021-09-20 PROCEDURE — 71046 X-RAY EXAM CHEST 2 VIEWS: CPT

## 2021-09-21 ENCOUNTER — TRANSCRIBE ORDERS (OUTPATIENT)
Dept: ADMINISTRATIVE | Facility: HOSPITAL | Age: 26
End: 2021-09-21

## 2021-09-21 DIAGNOSIS — G44.52 NEW DAILY PERSISTENT HEADACHE: Primary | ICD-10-CM

## 2021-09-29 ENCOUNTER — TELEPHONE (OUTPATIENT)
Dept: GASTROENTEROLOGY | Facility: CLINIC | Age: 26
End: 2021-09-29

## 2021-09-29 NOTE — TELEPHONE ENCOUNTER
I tried to call patient back. No answer; left voice message for patient to call us back.  Patient left voice message wondering what can she do until her appointment. Unfortunately I can't give any medical advice or recommendation until she is an established patient. I will route message to Jennifer.

## 2021-10-01 ENCOUNTER — OFFICE VISIT (OUTPATIENT)
Dept: NEUROLOGY | Facility: CLINIC | Age: 26
End: 2021-10-01

## 2021-10-01 VITALS
DIASTOLIC BLOOD PRESSURE: 66 MMHG | OXYGEN SATURATION: 99 % | HEART RATE: 58 BPM | HEIGHT: 62 IN | TEMPERATURE: 97.3 F | WEIGHT: 115 LBS | BODY MASS INDEX: 21.16 KG/M2 | SYSTOLIC BLOOD PRESSURE: 110 MMHG

## 2021-10-01 DIAGNOSIS — G44.209 TENSION-TYPE HEADACHE, NOT INTRACTABLE, UNSPECIFIED CHRONICITY PATTERN: Primary | ICD-10-CM

## 2021-10-01 PROCEDURE — 99214 OFFICE O/P EST MOD 30 MIN: CPT | Performed by: NURSE PRACTITIONER

## 2021-10-01 NOTE — PROGRESS NOTES
Headache New Office Visit      Encounter Date: 10/01/2021   Patient Name: Monae May  : 1995   MRN: [id number]   PCP: Dr Mike Payton  Referring Provider:   Chief Complaint:    Chief Complaint   Patient presents with   • Headache       History of Present Illness: Monae May is a 26 y.o. female who is here today with her father for evaluation of headaches.     Has a remote history of migraines, last more than one year ago presents with new daily, persistent headache that started in July.    Headache Symptoms:   Days per month: Daily. Starts 30 min after waking  Location: bilat temple and Frontal       Quality: Aching and Pressure   Sharp pain now resolved.    Duration: 2 hours to all day. Would wake her from sleep.  Severity: 310  Triggers: none  Associated Symptoms: Nausea, Scent sensitivity , Dizziness, Tinnitus and ring. Denies whooshing sound. Worse with laying down or bending over.  Aura: none  Visual Changes: no new changes.     Not taking NSAIDS on a daily basis. 2 propel and 2 water bottles/day.    Abortives: Tylenol, Imitrex, Aleve 220mg, Excedrin migraine  Preventives: bisoprolol fumarate                            History of migraines. Last was over a year ago.                                                  Has neurocardiogenic syncope.    Meds of note: Xanax, lo estrin, Corlanor    Cannot tolerate IV's of any kind and requests MRI be changed to without only. She has LOC with shaking and procedures are always cancelled.  Subjective      Past Medical History:   Past Medical History:   Diagnosis Date   • Asthma    • Chicken pox    • Insomnia    • Neurocardiogenic syncope 5/3/2017       Past Surgical History:   Past Surgical History:   Procedure Laterality Date   • ADENOIDECTOMY     • LYMPHADENECTOMY     • TONSILLECTOMY AND ADENOIDECTOMY     • WISDOM TOOTH EXTRACTION         Family History:   Family History   Problem Relation Age of Onset   • Thyroid disease Mother    • Coronary artery  disease Father 57        mI @ 57   • No Known Problems Brother    • Heart disease Maternal Grandmother    • Aortic aneurysm Maternal Grandfather    • Dementia Paternal Grandmother    • COPD Paternal Grandmother    • Heart disease Paternal Grandfather    • Kidney nephrosis Brother        Social History:   Social History     Socioeconomic History   • Marital status:      Spouse name: Not on file   • Number of children: 0   • Years of education: Not on file   • Highest education level: Not on file   Tobacco Use   • Smoking status: Never Smoker   • Smokeless tobacco: Never Used   Vaping Use   • Vaping Use: Never used   Substance and Sexual Activity   • Alcohol use: No   • Drug use: No   • Sexual activity: Yes     Birth control/protection: Pill, OCP       Medications:     Current Outpatient Medications:   •  albuterol (VENTOLIN HFA) 108 (90 BASE) MCG/ACT inhaler, Inhale 2 puffs As Needed for Wheezing., Disp: , Rfl:   •  ALPRAZolam (XANAX) 0.25 MG tablet, TAKE 1-2 TABLETS BY MOUTH AT BEDTIME, Disp: , Rfl: 0  •  beclomethasone (QVAR) 40 MCG/ACT inhaler, , Disp: , Rfl:   •  ivabradine HCl (Corlanor) 7.5 MG tablet tablet, Every 12 (Twelve) Hours., Disp: , Rfl:   •  Norethin-Eth Estrad-Fe Biphas (Lo Loestrin Fe) 1 MG-10 MCG / 10 MCG tablet, Take 1 tablet by mouth Daily., Disp: 84 tablet, Rfl: 12    Allergies:   Allergies   Allergen Reactions   • Ceftin [Cefuroxime Axetil] Nausea And Vomiting   • Cefdinir Nausea Only and Other (See Comments)       PHQ-9 Total Score:     STEADI Fall Risk Assessment has not been completed.    Objective     Physical Exam:   Physical Exam  Eyes:      Pupils: Pupils are equal, round, and reactive to light.   Neurological:      Mental Status: She is oriented to person, place, and time.      Coordination: Finger-Nose-Finger Test, Heel to Shin Test and Romberg Test normal.      Gait: Gait is intact.      Deep Tendon Reflexes:      Reflex Scores:       Tricep reflexes are 2+ on the right side  and 2+ on the left side.       Bicep reflexes are 2+ on the right side and 2+ on the left side.       Brachioradialis reflexes are 2+ on the right side and 2+ on the left side.       Patellar reflexes are 2+ on the right side and 2+ on the left side.       Achilles reflexes are 2+ on the right side and 2+ on the left side.  Psychiatric:         Speech: Speech normal.         Neurologic Exam     Mental Status   Oriented to person, place, and time.   Follows 3 step commands.   Attention: normal. Concentration: normal.   Speech: speech is normal   Level of consciousness: alert  Knowledge: consistent with education.   Normal comprehension.     Cranial Nerves     CN III, IV, VI   Pupils are equal, round, and reactive to light.  Right pupil: Accommodation: intact.   Left pupil: Accommodation: intact.   CN III: no CN III palsy  CN VI: no CN VI palsy  Nystagmus: none   Diplopia: none  Upgaze: normal  Downgaze: normal  Conjugate gaze: present    CN VII   Facial expression full, symmetric.     CN VIII   Hearing: intact    CN XII   CN XII normal.     Motor Exam   Muscle bulk: normal  Overall muscle tone: normal    Strength   Right biceps: 5/5  Left biceps: 5/5  Right triceps: 5/5  Left triceps: 5/5  Right interossei: 5/5  Left interossei: 5/5  Right quadriceps: 5/5  Left quadriceps: 5/5  Right anterior tibial: 5/5  Left anterior tibial: 5/5  Right posterior tibial: 5/5  Left posterior tibial: 5/5    Sensory Exam   Light touch normal.     Gait, Coordination, and Reflexes     Gait  Gait: normal    Coordination   Romberg: negative  Finger to nose coordination: normal  Heel to shin coordination: normal    Tremor   Resting tremor: absent  Action tremor: absent    Reflexes   Right brachioradialis: 2+  Left brachioradialis: 2+  Right biceps: 2+  Left biceps: 2+  Right triceps: 2+  Left triceps: 2+  Right patellar: 2+  Left patellar: 2+  Right achilles: 2+  Left achilles: 2+  Right : 2+  Left : 2+       Vital Signs:   Vitals:  "   10/01/21 1308   BP: 110/66   Pulse: 58   Temp: 97.3 °F (36.3 °C)   SpO2: 99%   Weight: 52.2 kg (115 lb)   Height: 157.5 cm (62\")     Body mass index is 21.03 kg/m².         Assessment / Plan      Assessment/Plan:   Diagnoses and all orders for this visit:    1. Tension-type headache, not intractable, unspecified chronicity pattern (Primary)  Comments:  Samples of Nurtec to try.  Orders:  -     MRI Brain With Contrast; Future           Patient Education:   I have discussed with the patient today the causes and overview of headaches. We discussed the different types of headaches to include tension-type headaches, Migraine headaches and Cluster headaches. We also discussed when headaches could or would be of a more serious condition such as brain infection, inflammation or bleeding within or around the brain. When to seek immediate medical attention or call 911.     Reviewed medications, potential side effects and signs and symptoms to report. Discussed risk versus benefits of treatment plan with patient and/or family-including medications, labs and radiology that may be ordered. Addressed questions and concerns during visit. Patient and/or family verbalized understanding and agree with plan. Instructed to call the office with any questions and report to ER with any life-threatening symptoms.     Follow Up:   After MRI.    During this visit the following were done:  Labs Reviewed []    Labs Ordered []    Radiology Reports Reviewed []    Radiology Ordered [x]    PCP Records Reviewed [x]    Referring Provider Records Reviewed []    ER Records Reviewed []    Hospital Records Reviewed []    History Obtained From Family []    Radiology Images Reviewed []    Other Reviewed [x]     Records Requested []      Diane Cummings, KB, APRN  "

## 2021-10-07 DIAGNOSIS — G44.209 TENSION-TYPE HEADACHE, NOT INTRACTABLE, UNSPECIFIED CHRONICITY PATTERN: Primary | ICD-10-CM

## 2021-10-07 RX ORDER — AMITRIPTYLINE HYDROCHLORIDE 10 MG/1
10 TABLET, FILM COATED ORAL NIGHTLY
Qty: 30 TABLET | Refills: 2 | Status: SHIPPED | OUTPATIENT
Start: 2021-10-07 | End: 2022-08-24 | Stop reason: ALTCHOICE

## 2021-10-12 ENCOUNTER — TELEPHONE (OUTPATIENT)
Dept: NEUROLOGY | Facility: CLINIC | Age: 26
End: 2021-10-12

## 2021-10-12 ENCOUNTER — APPOINTMENT (OUTPATIENT)
Dept: MRI IMAGING | Facility: HOSPITAL | Age: 26
End: 2021-10-12

## 2021-10-12 ENCOUNTER — OFFICE VISIT (OUTPATIENT)
Dept: GASTROENTEROLOGY | Facility: CLINIC | Age: 26
End: 2021-10-12

## 2021-10-12 VITALS
DIASTOLIC BLOOD PRESSURE: 78 MMHG | SYSTOLIC BLOOD PRESSURE: 132 MMHG | HEIGHT: 62 IN | WEIGHT: 117.8 LBS | HEART RATE: 78 BPM | TEMPERATURE: 97.1 F | OXYGEN SATURATION: 98 % | BODY MASS INDEX: 21.68 KG/M2

## 2021-10-12 DIAGNOSIS — G44.209 TENSION-TYPE HEADACHE, NOT INTRACTABLE, UNSPECIFIED CHRONICITY PATTERN: Primary | ICD-10-CM

## 2021-10-12 DIAGNOSIS — K20.80 PILL ESOPHAGITIS: Primary | ICD-10-CM

## 2021-10-12 DIAGNOSIS — T50.905A PILL ESOPHAGITIS: Primary | ICD-10-CM

## 2021-10-12 DIAGNOSIS — R19.7 DIARRHEA, UNSPECIFIED TYPE: ICD-10-CM

## 2021-10-12 PROCEDURE — 99213 OFFICE O/P EST LOW 20 MIN: CPT | Performed by: NURSE PRACTITIONER

## 2021-10-12 RX ORDER — TRAZODONE HYDROCHLORIDE 50 MG/1
25 TABLET ORAL NIGHTLY
COMMUNITY

## 2021-10-12 RX ORDER — SUCRALFATE 1 G/1
TABLET ORAL
COMMUNITY
Start: 2021-09-20

## 2021-10-12 RX ORDER — TOPIRAMATE 25 MG/1
TABLET ORAL
Qty: 120 TABLET | Refills: 0 | Status: SHIPPED | OUTPATIENT
Start: 2021-10-12 | End: 2021-10-14 | Stop reason: SDUPTHER

## 2021-10-12 NOTE — PROGRESS NOTES
New Patient Consultation     Patient Name: Monae May  : 1995   MRN: [id number]     Chief Complaint:    Chief Complaint   Patient presents with   • Heartburn   • Diarrhea     cramps, abdominal pain, bloating        History of Present Illness: Monae May is a 26 y.o. female who is here today for a Gastroenterology Consultation for GERD, Diarrhea.    Symptoms began about 4 weeks ago after a round of abx (doxycline ) for a sinus infection.  After starting the doxycycline she began having epigastric pain, heartburn, abdominal cramps and bloating.  Her symptoms self improved but returned after second round of abx (z-pack).  She is having some diarrhea and the stool is yellow.  She is having a BM about 1-2 times a day.  She has not had any stool studies.  Her symptoms had been getting better for about a week but the symptoms returned yesterday.  Nexium and carafate were helpful for the chest pain/epigastric pain. There is no fever or chills. There was some weight loss as it was painful to eat initially.  She does have some esophageal dysphagia.  She has had anxiety surrounding this.  She is taking kefir which may be helping.      XR Abdomen Flat & Upright (2021 14:50)    Subjective      Review of Systems:   Review of Systems   Constitutional: Negative for appetite change and unexpected weight loss.   HENT: Negative for trouble swallowing.    Gastrointestinal: Positive for abdominal distention, abdominal pain, diarrhea, nausea, GERD and indigestion. Negative for anal bleeding, blood in stool, constipation, rectal pain and vomiting.       Past Medical History:   Past Medical History:   Diagnosis Date   • Asthma    • Chicken pox    • Insomnia    • Neurocardiogenic syncope 5/3/2017       Past Surgical History:   Past Surgical History:   Procedure Laterality Date   • ADENOIDECTOMY     • LYMPHADENECTOMY     • TONSILLECTOMY AND ADENOIDECTOMY     • WISDOM TOOTH EXTRACTION         Family History:   Family  History   Problem Relation Age of Onset   • Thyroid disease Mother    • Coronary artery disease Father 57        mI @ 57   • No Known Problems Brother    • Heart disease Maternal Grandmother    • Aortic aneurysm Maternal Grandfather    • Dementia Paternal Grandmother    • COPD Paternal Grandmother    • Heart disease Paternal Grandfather    • Kidney nephrosis Brother    • Colon cancer Neg Hx    • Colon polyps Neg Hx    • Esophageal cancer Neg Hx        Social History:   Social History     Socioeconomic History   • Marital status:    • Number of children: 0   Tobacco Use   • Smoking status: Never Smoker   • Smokeless tobacco: Never Used   Vaping Use   • Vaping Use: Never used   Substance and Sexual Activity   • Alcohol use: No   • Drug use: No   • Sexual activity: Yes     Birth control/protection: Pill, OCP       Alcohol/Tobacco History:   Social History     Substance and Sexual Activity   Alcohol Use No     Social History     Tobacco Use   Smoking Status Never Smoker   Smokeless Tobacco Never Used       Medications:     Current Outpatient Medications:   •  albuterol (VENTOLIN HFA) 108 (90 BASE) MCG/ACT inhaler, Inhale 2 puffs As Needed for Wheezing., Disp: , Rfl:   •  ALPRAZolam (XANAX) 0.25 MG tablet, TAKE 1-2 TABLETS BY MOUTH AT BEDTIME, Disp: , Rfl: 0  •  beclomethasone (QVAR) 40 MCG/ACT inhaler, , Disp: , Rfl:   •  esomeprazole (nexIUM) 20 MG capsule, Take 20 mg by mouth Every Morning Before Breakfast., Disp: , Rfl:   •  ivabradine HCl (Corlanor) 7.5 MG tablet tablet, Every 12 (Twelve) Hours., Disp: , Rfl:   •  Norethin-Eth Estrad-Fe Biphas (Lo Loestrin Fe) 1 MG-10 MCG / 10 MCG tablet, Take 1 tablet by mouth Daily., Disp: 84 tablet, Rfl: 12  •  traZODone (DESYREL) 50 MG tablet, Take 25 mg by mouth Every Night., Disp: , Rfl:   •  amitriptyline (ELAVIL) 10 MG tablet, Take 1 tablet by mouth Every Night., Disp: 30 tablet, Rfl: 2  •  sucralfate (CARAFATE) 1 g tablet, , Disp: , Rfl:   •  topiramate (Topamax) 25  "MG tablet, 1 bid x 1 week, then 2 bid x 2 weeks, Disp: 120 tablet, Rfl: 0    Allergies:   Allergies   Allergen Reactions   • Amoxicillin Other (See Comments)   • Ceftin [Cefuroxime Axetil] Nausea And Vomiting   • Doxycycline Other (See Comments)   • Cefdinir Nausea Only and Other (See Comments)       Objective     Physical Exam:  Vital Signs:   Vitals:    10/12/21 1007   BP: 132/78   BP Location: Left arm   Patient Position: Sitting   Cuff Size: Adult   Pulse: 78   Temp: 97.1 °F (36.2 °C)   TempSrc: Temporal   SpO2: 98%   Weight: 53.4 kg (117 lb 12.8 oz)   Height: 157.5 cm (62.01\")     Body mass index is 21.54 kg/m².     Physical Exam  Vitals and nursing note reviewed.   Constitutional:       General: She is not in acute distress.     Appearance: She is well-developed. She is not diaphoretic.   Eyes:      General: No scleral icterus.     Extraocular Movements:      Right eye: No nystagmus.      Left eye: No nystagmus.      Conjunctiva/sclera: Conjunctivae normal.      Pupils: Pupils are equal, round, and reactive to light.   Neck:      Thyroid: No thyromegaly.   Cardiovascular:      Rate and Rhythm: Normal rate and regular rhythm.   Pulmonary:      Effort: Pulmonary effort is normal.      Breath sounds: Normal breath sounds.   Abdominal:      General: Bowel sounds are normal. There is no distension. There are no signs of injury.      Palpations: Abdomen is soft. There is no hepatomegaly or splenomegaly.      Tenderness: There is abdominal tenderness in the epigastric area. There is no guarding or rebound. Negative signs include Senior's sign.      Hernia: No hernia is present.      Comments: Mild epigastric tenderness   Musculoskeletal:      Cervical back: Neck supple.      Right lower leg: No edema.      Left lower leg: No edema.   Skin:     General: Skin is warm and dry.      Capillary Refill: Capillary refill takes 2 to 3 seconds.      Coloration: Skin is not jaundiced or pale.      Findings: No bruising or " petechiae.      Nails: There is no clubbing.   Neurological:      Mental Status: She is alert and oriented to person, place, and time.   Psychiatric:         Behavior: Behavior normal.         Thought Content: Thought content normal.         Judgment: Judgment normal.       Viewed refer provider office note  Assessment / Plan      Assessment/Plan:   Diagnoses and all orders for this visit:    1. Pill esophagitis (Primary)  -     H. Pylori Antigen, Stool - Stool, Per Rectum; Future    2. Diarrhea, unspecified type  -     Gastrointestinal Panel, PCR - Stool, Per Rectum; Future  -     Clostridium Difficile Toxin, PCR - Stool, Per Rectum; Future  -     H. Pylori Antigen, Stool - Stool, Per Rectum; Future       Likely secondary doxycycline.  Continue PPI for 6 weeks then may discontinue.  Test stool for infection, continue Arturo.  Advance diet as tolerated    Follow Up:   Return in about 6 weeks (around 11/23/2021), or if symptoms worsen or fail to improve.    Plan of care reviewed with the patient at the conclusion of today's visit.  Education was provided regarding diagnosis, management, and any prescribed or recommended OTC medications.  Patient verbalized understanding of and agreement with management plan.     Time Statement:   Discussed plan of care in detail with patient today. Patient verbally understands and agrees. I have spent 20 minutes reviewing available diagnostics, obtaining history, examining the patient, developing a treatment plan, and educating the patient on disease process and plan of care.    DEVORA Bradley  [facility]

## 2021-10-12 NOTE — TELEPHONE ENCOUNTER
I called to discuss HA meds. Mailbox was full and unable to leave a message. Sent her a pt message and requested she call me back or schedule a video visit to discuss.

## 2021-10-12 NOTE — TELEPHONE ENCOUNTER
Monae called back. She was on Elavil 50mg for 10 years. Would like to try something else for her migraine.  Will send in TPM.

## 2021-10-13 ENCOUNTER — LAB (OUTPATIENT)
Dept: LAB | Facility: HOSPITAL | Age: 26
End: 2021-10-13

## 2021-10-13 DIAGNOSIS — K20.80 PILL ESOPHAGITIS: ICD-10-CM

## 2021-10-13 DIAGNOSIS — R19.7 DIARRHEA, UNSPECIFIED TYPE: ICD-10-CM

## 2021-10-13 DIAGNOSIS — T50.905A PILL ESOPHAGITIS: ICD-10-CM

## 2021-10-13 LAB
ADV 40+41 DNA STL QL NAA+NON-PROBE: NOT DETECTED
ASTRO TYP 1-8 RNA STL QL NAA+NON-PROBE: NOT DETECTED
C CAYETANENSIS DNA STL QL NAA+NON-PROBE: NOT DETECTED
C COLI+JEJ+UPSA DNA STL QL NAA+NON-PROBE: NOT DETECTED
C DIFF TOX GENS STL QL NAA+PROBE: NOT DETECTED
CRYPTOSP DNA STL QL NAA+NON-PROBE: NOT DETECTED
E HISTOLYT DNA STL QL NAA+NON-PROBE: NOT DETECTED
EAEC PAA PLAS AGGR+AATA ST NAA+NON-PRB: NOT DETECTED
EC STX1+STX2 GENES STL QL NAA+NON-PROBE: NOT DETECTED
EPEC EAE GENE STL QL NAA+NON-PROBE: NOT DETECTED
ETEC LTA+ST1A+ST1B TOX ST NAA+NON-PROBE: NOT DETECTED
G LAMBLIA DNA STL QL NAA+NON-PROBE: NOT DETECTED
NOROVIRUS GI+II RNA STL QL NAA+NON-PROBE: NOT DETECTED
P SHIGELLOIDES DNA STL QL NAA+NON-PROBE: NOT DETECTED
RVA RNA STL QL NAA+NON-PROBE: NOT DETECTED
S ENT+BONG DNA STL QL NAA+NON-PROBE: NOT DETECTED
SAPO I+II+IV+V RNA STL QL NAA+NON-PROBE: NOT DETECTED
SHIGELLA SP+EIEC IPAH ST NAA+NON-PROBE: NOT DETECTED
V CHOL+PARA+VUL DNA STL QL NAA+NON-PROBE: NOT DETECTED
V CHOLERAE DNA STL QL NAA+NON-PROBE: NOT DETECTED
Y ENTEROCOL DNA STL QL NAA+NON-PROBE: NOT DETECTED

## 2021-10-13 PROCEDURE — 87493 C DIFF AMPLIFIED PROBE: CPT

## 2021-10-13 PROCEDURE — 0097U HC BIOFIRE FILMARRAY GI PANEL: CPT

## 2021-10-13 PROCEDURE — 87338 HPYLORI STOOL AG IA: CPT

## 2021-10-14 DIAGNOSIS — G44.209 TENSION-TYPE HEADACHE, NOT INTRACTABLE, UNSPECIFIED CHRONICITY PATTERN: ICD-10-CM

## 2021-10-14 RX ORDER — TOPIRAMATE 25 MG/1
TABLET ORAL
Qty: 360 TABLET | Refills: 1 | Status: SHIPPED | OUTPATIENT
Start: 2021-10-14

## 2021-10-14 NOTE — TELEPHONE ENCOUNTER
Rx Refill Note  Requested Prescriptions     Pending Prescriptions Disp Refills   • topiramate (Topamax) 25 MG tablet 360 tablet 1     Si bid x 1 week, then 2 bid      This was sent in on 10/12/21, however insurance will only cover as a 90 day supply. Done. Thanks!    Last office visit with prescribing clinician: 10/1/2021      Next office visit with prescribing clinician: 2021

## 2021-10-17 LAB — H PYLORI AG STL QL IA: NEGATIVE

## 2021-10-19 ENCOUNTER — TELEPHONE (OUTPATIENT)
Dept: NEUROLOGY | Facility: CLINIC | Age: 26
End: 2021-10-19

## 2021-10-19 NOTE — TELEPHONE ENCOUNTER
----- Message from Monae May sent at 10/19/2021 10:10 AM EDT -----  Regarding: Headaches  I didn't realize until after I saw you that I was experiencing really bad anxiety. I am now working with my doctor to get that under control and have been placed on Prozac. Also, the ringing in my ears has subsided but now is more of a flutter. Since I am still having the headaches on my temples, forehead, nose, ears, in between and under eyes, cheeks and tooth pain (went to the dentist and teeth are fine) do you think it's possible that it is just a combination of anxiety and sinus inflammation? [place] do currently [place] neighborhood and only 3 houses down from the new are that is being excavated and full of ragweed. Should I wait and see if once I get my anxiety under control and we have our first frost if they go away and then if not start the Topomax? Thank you!

## 2021-12-10 ENCOUNTER — OFFICE VISIT (OUTPATIENT)
Dept: OBSTETRICS AND GYNECOLOGY | Facility: CLINIC | Age: 26
End: 2021-12-10

## 2021-12-10 VITALS
BODY MASS INDEX: 22.31 KG/M2 | WEIGHT: 121.2 LBS | HEIGHT: 62 IN | RESPIRATION RATE: 14 BRPM | SYSTOLIC BLOOD PRESSURE: 104 MMHG | DIASTOLIC BLOOD PRESSURE: 64 MMHG

## 2021-12-10 DIAGNOSIS — Z30.40 ENCOUNTER FOR REFILL OF PRESCRIPTION FOR CONTRACEPTION: ICD-10-CM

## 2021-12-10 DIAGNOSIS — Z01.419 WELL WOMAN EXAM WITH ROUTINE GYNECOLOGICAL EXAM: Primary | ICD-10-CM

## 2021-12-10 DIAGNOSIS — G43.109 MIGRAINE WITH AURA AND WITHOUT STATUS MIGRAINOSUS, NOT INTRACTABLE: ICD-10-CM

## 2021-12-10 PROCEDURE — 99395 PREV VISIT EST AGE 18-39: CPT | Performed by: STUDENT IN AN ORGANIZED HEALTH CARE EDUCATION/TRAINING PROGRAM

## 2021-12-10 RX ORDER — DROSPIRENONE 4 MG/1
1 TABLET, FILM COATED ORAL DAILY
Qty: 28 TABLET | Refills: 12 | Status: SHIPPED | OUTPATIENT
Start: 2021-12-10 | End: 2022-12-13

## 2021-12-10 NOTE — PROGRESS NOTES
"Subjective   Chief Complaint   Patient presents with   • Gynecologic Exam     No complaints     Monae May is a 26 y.o. year old  presenting to be seen for her annual exam. She is doing well and has no complaints.     Last pap 2019: NILM.  Due .    SEXUAL Hx:  She is currently sexually active.  In the past year there there has been NO new sexual partners.    Condoms are never used.  She would not like to be screened for STD's at today's exam.  Current birth control method: OCP (estrogen/progesterone)   She is not happy with her current method of contraception and does want to discuss alternative methods of contraception.  MENSTRUAL Hx:  No LMP recorded.  In the past 6 months her cycles have been regular, predictable and occur monthly.  Her menstrual flow is typically light.   Each month on average there are roughly 0 day(s) of very heavy flow.  Intermenstrual bleeding is absent.    Post-coital bleeding is absent.  Dysmenorrhea: none  PMS: none and is not affecting her activities of daily living  Her cycles ARE a source of concern for her that she wishes to discuss today.  HEALTH Hx:  She exercises regularly: yes.  She wears her seat belt: yes.  She has concerns about domestic violence: no.  OTHER THINGS SHE WANTS TO DISCUSS TODAY:  Nothing else    The following portions of the patient's history were reviewed and updated as appropriate:problem list, current medications, allergies, past family history, past medical history, past social history and past surgical history.    Social History    Tobacco Use      Smoking status: Never Smoker      Smokeless tobacco: Never Used         Objective   /64 (BP Location: Right arm, Patient Position: Sitting, Cuff Size: Adult)   Resp 14   Ht 157.5 cm (62\")   Wt 55 kg (121 lb 3.2 oz)   Breastfeeding No   BMI 22.17 kg/m²     General:  well developed; well nourished  no acute distress   Skin:  No suspicious lesions seen   Thyroid: not examined   Breasts:  " Examined in supine position  Symmetric without masses or skin dimpling  Nipples normal without inversion, lesions or discharge  There are no palpable axillary nodes   Abdomen: soft, non-tender; no masses  no umbilical or inguinal hernias are present   Pelvis: Clinical staff was present for exam  External genitalia:  normal appearance of the external genitalia including Bartholin's and Dorothy's glands.  :  urethral meatus normal;  Vaginal:  normal pink mucosa without prolapse or lesions.  Cervix:  normal appearance.  Uterus:  normal size, shape and consistency.  Adnexa:  normal bimanual exam of the adnexa.  Rectal:  digital rectal exam not performed; anus visually normal appearing.        Assessment   1. Normal GYN exam  2. Migraine with intermittent aura   3. Contraception counseling   4. She is up to date on all relevant gynecologic screenings     Plan   1. Pap was not done today.  I explained to Monae that the recommendations for Pap smear interval in a low risk patient has lengthened to 3 years time.  I told Monae she still needs to be seen in our office yearly for a full physical including breast and pelvic exam.  2. Due to patient's history of migraines with aura, contraception changed from combined OCP to progesterone only OCP.  Prescription for Slynd 4 mg daily sent to patient pharmacy.   3. I discussed with Monae that she may be behind on needed vaccinations for Influenza and COVID.  She may be able to obtain these vaccinations at her local pharmacy OR speak about obtaining them with her primary care.  If she does obtain her vaccines, I have asked Monae to let us know the date each vaccine was obtained so that her medical record could be updated in our system.  4. The importance of keeping all planned follow-up and taking all medications as prescribed was emphasized.  5. Follow up for annual exam in 1 year    No orders of the defined types were placed in this encounter.         This note was electronically  signed.    April Lanier MD   December 10, 2021

## 2022-02-07 ENCOUNTER — TELEPHONE (OUTPATIENT)
Dept: OBSTETRICS AND GYNECOLOGY | Facility: CLINIC | Age: 27
End: 2022-02-07

## 2022-02-07 RX ORDER — ACETAMINOPHEN AND CODEINE PHOSPHATE 120; 12 MG/5ML; MG/5ML
1 SOLUTION ORAL DAILY
Qty: 28 TABLET | Refills: 12 | Status: SHIPPED | OUTPATIENT
Start: 2022-02-07 | End: 2022-12-13 | Stop reason: SDUPTHER

## 2022-02-07 NOTE — TELEPHONE ENCOUNTER
Dr. Lanier's patient is wanting to switch birth control due to side effects: hot flashes, hair shedding, anxious, and emotional.

## 2022-02-07 NOTE — TELEPHONE ENCOUNTER
[phone] called patient to let her know Dr. Lanier has sent in a different OCP for her. Patient verbalized understanding.

## 2022-02-07 NOTE — TELEPHONE ENCOUNTER
Patient called and stated that her birth control Slynd is not working for her, she wants to know if she can be switched back to her other birth control, her number is [phone]

## 2022-08-23 ENCOUNTER — TELEPHONE (OUTPATIENT)
Dept: OBSTETRICS AND GYNECOLOGY | Facility: CLINIC | Age: 27
End: 2022-08-23

## 2022-08-23 NOTE — TELEPHONE ENCOUNTER
Patient called, stating she has been experiencing white discharge, itchiness, burning and a sour smell. Patient states this has been going on for about 2-3 weeks. Patient wants to know if something can be called into [facility] or if she should come in. Please give patient a call

## 2022-08-24 ENCOUNTER — OFFICE VISIT (OUTPATIENT)
Dept: OBSTETRICS AND GYNECOLOGY | Facility: CLINIC | Age: 27
End: 2022-08-24

## 2022-08-24 VITALS
SYSTOLIC BLOOD PRESSURE: 100 MMHG | BODY MASS INDEX: 22.26 KG/M2 | RESPIRATION RATE: 14 BRPM | HEIGHT: 62 IN | WEIGHT: 121 LBS | DIASTOLIC BLOOD PRESSURE: 62 MMHG

## 2022-08-24 DIAGNOSIS — B37.31 YEAST VAGINITIS: Primary | ICD-10-CM

## 2022-08-24 PROCEDURE — 99213 OFFICE O/P EST LOW 20 MIN: CPT | Performed by: STUDENT IN AN ORGANIZED HEALTH CARE EDUCATION/TRAINING PROGRAM

## 2022-08-24 RX ORDER — ESCITALOPRAM OXALATE 10 MG/1
TABLET ORAL
COMMUNITY

## 2022-08-24 RX ORDER — FLUCONAZOLE 150 MG/1
150 TABLET ORAL DAILY
Qty: 2 TABLET | Refills: 0 | Status: SHIPPED | OUTPATIENT
Start: 2022-08-24 | End: 2022-08-26

## 2022-08-24 RX ORDER — BISOPROLOL FUMARATE 5 MG/1
TABLET, FILM COATED ORAL
COMMUNITY

## 2022-08-24 NOTE — PROGRESS NOTES
"Subjective   Chief Complaint   Patient presents with   • Vaginitis     Has a yeast infection that will not go away; white vaginal discharge with itching.     Monae May is a 27 y.o. year old .  No LMP recorded (lmp unknown).  She presents for persistent yeast infection of the last 2 to 3 weeks, with white vaginal discharge and itching. Patient reports her PCP gave her a one time dose of Diflucan last Tuesday that somewhat helped but the symptoms. Denies any vaginal bleeding. Denies any new sexual partners recently.     The following portions of the patient's history were reviewed and updated as appropriate:current medications, allergies and past medical history    Social History    Tobacco Use      Smoking status: Never Smoker      Smokeless tobacco: Never Used         Objective   /62 (BP Location: Right arm, Patient Position: Sitting, Cuff Size: Adult)   Resp 14   Ht 157.5 cm (62\")   Wt 54.9 kg (121 lb)   LMP  (LMP Unknown)   Breastfeeding No   BMI 22.13 kg/m²     General:  well developed; well nourished  no acute distress   Lungs:  breathing is unlabored   Heart:  Not performed.   Pelvis: Clinical staff was present for exam  External genitalia:  normal appearance of the external genitalia including Bartholin's and [place]'s glands.  :  urethral meatus normal;  Vaginal:  normal pink mucosa without prolapse or lesions. discharge present -  white;  Cervix:  normal appearance.     Lab Review   No data reviewed    Imaging   No data reviewed        Assessment   1. Yeast vaginitis      Plan   1. Leukorrhea swab collected.  Will treat with 2 dose regimen of Diflucan, 1 dose to be taken today and another in 72 hours.  2. Will call patient with any additional abnormal results, and adjust treatment.  Patient to call if symptoms not resolved in 1 week, and will add on 1 week of vaginal Monistat.  3. The importance of keeping all planned follow-up and taking all medications as prescribed was " emphasized.  4. Follow up for annual exam as scheduled 12/2022.     No orders of the defined types were placed in this encounter.         This note was electronically signed.    April Lanier MD   August 24, 2022

## 2022-12-13 ENCOUNTER — OFFICE VISIT (OUTPATIENT)
Dept: OBSTETRICS AND GYNECOLOGY | Facility: CLINIC | Age: 27
End: 2022-12-13

## 2022-12-13 VITALS
HEIGHT: 62 IN | WEIGHT: 122.4 LBS | DIASTOLIC BLOOD PRESSURE: 68 MMHG | SYSTOLIC BLOOD PRESSURE: 102 MMHG | BODY MASS INDEX: 22.52 KG/M2 | RESPIRATION RATE: 14 BRPM

## 2022-12-13 DIAGNOSIS — Z01.419 WOMEN'S ANNUAL ROUTINE GYNECOLOGICAL EXAMINATION: Primary | ICD-10-CM

## 2022-12-13 DIAGNOSIS — Z01.419 PAP TEST, AS PART OF ROUTINE GYNECOLOGICAL EXAMINATION: ICD-10-CM

## 2022-12-13 DIAGNOSIS — Z30.40 ENCOUNTER FOR REFILL OF PRESCRIPTION FOR CONTRACEPTION: ICD-10-CM

## 2022-12-13 PROCEDURE — 99395 PREV VISIT EST AGE 18-39: CPT | Performed by: STUDENT IN AN ORGANIZED HEALTH CARE EDUCATION/TRAINING PROGRAM

## 2022-12-13 RX ORDER — ACETAMINOPHEN AND CODEINE PHOSPHATE 120; 12 MG/5ML; MG/5ML
1 SOLUTION ORAL DAILY
Qty: 90 TABLET | Refills: 3 | Status: SHIPPED | OUTPATIENT
Start: 2022-12-13 | End: 2023-12-13

## 2022-12-13 NOTE — PROGRESS NOTES
"Subjective   Chief Complaint   Patient presents with   • Annual Exam     No complaints     Monae May is a 27 y.o. year old  presenting to be seen for her annual exam. She is doing well today and has no complaints. She needs a refill on her POPs.     SEXUAL Hx:  She is currently sexually active.  In the past year there there has been NO new sexual partners.    She would not like to be screened for STD's at today's exam.  Current birth control method: OCP (progestin only).  She is happy with her current method of contraception and does not want to discuss alternative methods of contraception.  MENSTRUAL Hx:  No LMP recorded (lmp unknown). (Menstrual status: Oral contraceptives).  In the past 6 months her cycles have been absent.    Intermenstrual bleeding is absent.    Post-coital bleeding is absent.  Dysmenorrhea: none  PMS: none  Her cycles are not a source of concern for her that she wishes to discuss today.  HEALTH Hx:  She exercises regularly: yes. Walk and lift weights.   She wears her seat belt: yes.  She has concerns about domestic violence: no.    The following portions of the patient's history were reviewed and updated as appropriate:problem list, current medications, allergies, past family history, past medical history, past social history and past surgical history.    Social History    Tobacco Use      Smoking status: Never      Smokeless tobacco: Never         Objective   /68 (BP Location: Right arm, Patient Position: Sitting, Cuff Size: Adult)   Resp 14   Ht 157.5 cm (62\")   Wt 55.5 kg (122 lb 6.4 oz)   LMP  (LMP Unknown)   Breastfeeding No   BMI 22.39 kg/m²     General:  well developed; well nourished  no acute distress   Skin:  No suspicious lesions seen   Breasts:  Examined in supine position  Symmetric without masses or skin dimpling  Nipples normal without inversion, lesions or discharge  There are no palpable axillary nodes  Fibrocystic changes are present both breasts " without a discrete mass   Pelvis: Clinical staff was present for exam  External genitalia:  normal appearance of the external genitalia including Bartholin's and Chenoweth's glands.  :  urethral meatus normal;  Vaginal:  normal pink mucosa without prolapse or lesions.  Cervix:  normal appearance. ectropian present;  Uterus:  normal size, shape and consistency.  Adnexa:  normal bimanual exam of the adnexa.        Assessment   1. Normal annual GYN exam   2. Contraception refill     Plan   1. Pap was done today.  If she does not receive the results of the Pap within 2 weeks  time, she was instructed to call to find out the results.  I explained to Monae that the recommendations for Pap smear interval in a low risk patient's has lengthened to 3 years time.  I encouraged her to be seen yearly for a full physical exam including breast and pelvic exam even during the off years when PAP's will not be performed.  2. Continue POPs.  90-day supply with 1 year refill sent to patient pharmacy.   3. The importance of keeping all planned follow-up and taking all medications as prescribed was emphasized.  4. Follow up for annual exam in 1 year or sooner PRN     New Medications Ordered This Visit   Medications   • norethindrone (MICRONOR) 0.35 MG tablet     Sig: Take 1 tablet by mouth Daily.     Dispense:  90 tablet     Refill:  3          This note was electronically signed.    April Lanier MD   December 13, 2022

## 2022-12-14 LAB — REF LAB TEST METHOD: NORMAL

## 2023-06-05 ENCOUNTER — PATIENT MESSAGE (OUTPATIENT)
Dept: OBSTETRICS AND GYNECOLOGY | Facility: CLINIC | Age: 28
End: 2023-06-05
Payer: COMMERCIAL

## 2023-06-06 RX ORDER — NORETHINDRONE ACETATE AND ETHINYL ESTRADIOL 1MG-20(21)
1 KIT ORAL DAILY
Qty: 28 TABLET | Refills: 12 | Status: SHIPPED | OUTPATIENT
Start: 2023-06-06 | End: 2024-06-05

## 2023-06-06 NOTE — TELEPHONE ENCOUNTER
Ms. May is taking the progesterone only pill that does not have a placebo pill, she is not a smoker and has no history of blood clots and would like to try a combo Estrogen/Progesterone birth control pill

## 2023-06-06 NOTE — TELEPHONE ENCOUNTER
From: Monae Hernandezed  To: April High  Sent: 6/5/2023 2:42 PM EDT  Subject: Birth Control    My birth control was preventing my periods but the last two months I have had really bad and long periods. Is there anything I can do to prevent them? Thank you!

## 2023-08-14 NOTE — TELEPHONE ENCOUNTER
Internal Medicine Progress Note     Patient: Cheo Giron  MRN: [id number]  YOB: 1945, 77 year old, male    Admission Date: 8/8/2023  Current Hospital Day: Hospital Day: 7  Attending: Zafar Chapman MD  PCP: Skyler Mesa MD    Resident Team: YELLOW     Chief Complaint   Patient presents with   • Shortness of Breath        Code Status: Full Resuscitation    Patient Summary     Collateral information from nursing home documentation, patient,and ED admission.      Cheo Giron is a 77 year old male with a PMHx of asthma, COPD, HTN, CAD, Afib (not on AC), medronic CKD, MI s/p CABG (1993, follows Dr. Philip gamez), s/p dual chamber pacemaker/ICD (successful interogation 7/7/23), recent hip fracture status post revision (7/7/2023), chronic indwelling sears who presented to ED on 8/8/2023 from [facility] and shortness of breath.  Was started on Levaquin for a right lower lobe infiltrate on 07/31. UA with UTI on 07/25.  Despite antibiotics the patient continues ill and endorses a cough productive of clear sputum and SOB.  Denies home oxygen needs. Reports recent loose stools. Of note, he was recently seen at [facility] 7/5 - 7/10 for a left hip dislocation s/p revision on 7/7.  The patient has limited ambulation since. Chronic indwelling catheter unclear when it was last changed prior to admission. On admission, the patient was not in acute distress, chronically ill-appearing.  SpO2 88% on room air.  Improved to 96% with 2L NC. Breath sounds are coarse bilaterally. WBC 30. Procalcitonin 0.34.  BNP 2238 (was 2213 in the past).  CXR with patchy bilateral infiltrates without fluid overload; similar to previous imaging with interstitial fibrosis and scarring. Concern for possible superimposed infiltrate in the LLL. C. difficile negative. Exchanged indwelling catheter. Given 500 cc fluid bolus and started on vanc/cefepime.   On 8/10/23, there was a concern for GI bleed after he sustained episode of melena in the setting of  BAIRES    PT NEEDS #90 SUPPLY ON THE LO LO ESTRIN BC PILL. A #30 DAY SUPPLY WAS CALLED IN AND INSURANCE IS SUGGESTING #90.      PHARM:  [address]   worsening abdominal pain. No pneumonia appreciated on imaging after review by ID and pulmonology. Pulmonology recommend consultation with palliative considering the patient's end-stage interstitial lung disease. GI consulted for bleed, they elected not to scope him as his mentation was fluctuating. Geriatrics and palliative were consulted to help with patient's clinical course.     Interval History     No overnight events. He was kept NPO at midnight; is switched to liquid clears this a.m. Patient is comfortable and more awake when I talk with him this morning. He has mild abdominal pain, but no other concerns. He is very tired.     VSS. On nasal cannula.     Scheduled Medications:  potassium CHLORIDE, 20 mEq, Once   Followed by  potassium CHLORIDE, 20 mEq, Once  GENTAMICIN - PHARMACIST MONITORED, , See Admin Instructions  gentamicin, 7 mg/kg (Adjusted), Q36H  lidocaine, 2 patch, Daily  buPROPion, 75 mg, BID  guaiFENesin, 400 mg, 6 times per day  ondansetron, 4 mg, BID  pantoprazole, 40 mg, 2 times per day  atorvastatin, 40 mg, Daily  DULoxetine, 30 mg, Daily  folic acid, 1 mg, Daily  gabapentin, 300 mg, Nightly  furosemide, 20 mg, Daily  metoPROLOL tartrate, 12.5 mg, 2 times per day  mirtazapine, 7.5 mg, QHS  dronedarone, 400 mg, BID  [Held by provider] linaclotide, 145 mcg, QAM AC  sodium chloride (PF), 2 mL, 2 times per day  [Held by provider] enoxaparin, 40 mg, Daily  Potassium Standard Replacement Protocol (Levels 3.5 and lower), , See Admin Instructions  Magnesium Standard Replacement Protocol, , See Admin Instructions  Phosphorus Standard Replacement Protocol, , See Admin Instructions  Potassium Replacement (Levels 3.6 - 4), , See Admin Instructions      PRN Medications:  sodium chloride, , Continuous PRN  ipratropium, 2 puff, Q6H Resp PRN  albuterol, 2 puff, Q4H Resp PRN  lidocaine 2% urethral, 10 mL, Once PRN  sodium chloride, 25 mL, PRN  sodium chloride, 25 mL, PRN  acetaminophen, 650 mg, Q4H  PRN  aluminum-magnesium hydroxide-simethicone, 30 mL, Q4H PRN      Continuous Infusions:  Current Facility-Administered Medications   Medication Dose Route Frequency Provider Last Rate Last Admin   • sodium chloride 0.9% infusion   Intravenous Continuous PRN Anna Marie Ernst MD       • dextrose 5 % / lactated ringers infusion   Intravenous Continuous Inés Almanzar, DO 75 mL/hr at 08/14/23 0401 New Bag at 08/14/23 0401       Objective Findings     Vital Last Value 24 Hour Range   Temperature 99.5 °F (37.5 °C) Temp  Min: 97.9 °F (36.6 °C)  Max: 99.5 °F (37.5 °C)   Pulse 67 Pulse  Min: 63  Max: 70   Respiratory 18 Resp  Min: 18  Max: 18   BloodPressure 133/66 BP  Min: 107/64  Max: 155/77   Pulse Oximetry 93 % SpO2  Min: 93 %  Max: 97 %   CVP   No data recorded     Vital Last Value Initial Value   Weight 62.5 kg (137 lb 12.6 oz) Weight: 63.6 kg (140 lb 3.2 oz)   Height 5' 8\" (172.7 cm) Height: 5' 8\" (172.7 cm)       Intake/Output Summary (Last 24 hours) at 8/14/2023 0739  Last data filed at 8/13/2023 1800  Gross per 24 hour   Intake 3341.3 ml   Output 575 ml   Net 2766.3 ml     PHYSICAL EXAM  General: sleepy, cooperative, frail, ill-appearing, pale  HEENT: Normocephalic  Cardiovascular: RRR, no murmurs.   Respiratory: Coarse crackles bilateral bases and upper lobes. No increased WOB. Stable O2 on NC.   Gastrointestinal: Flat abdomen soft, not distended, no rebound. Mild abdominal tenderness, suprapubic.   Neurological: A&OX1  Skin: Warm, intact, well perfused, no cyanosis  Psychiatric: Appropriate mood and affect, attention waxing/waning  Extremities:  No peripheral edema, no clubbing     Lab Results:  Comprehensive metabolic panel  Lab Results   Component Value Date    SODIUM 140 08/14/2023    POTASSIUM 3.9 08/14/2023    CHLORIDE 106 08/14/2023    CO2 30 08/14/2023    ANIONGAP 8 08/14/2023    BUN 11 08/14/2023    CREATININE 1.02 08/14/2023    CALCIUM 8.6 08/14/2023    GLUCOSE 99 08/14/2023    CPK 43 10/18/2021     LIPA 49 08/10/2023    WBC 12.1 (H) 08/14/2023    HCT 27.1 (L) 08/14/2023       Complete blood count  Lab Results   Component Value Date    HGB 8.4 (L) 08/14/2023     08/14/2023    INR 1.1 08/10/2023    MG 2.0 08/08/2023    ALKPT 117 08/13/2023    VB12 688 10/04/2021    BILIRUBIN 0.6 08/13/2023    AST 47 (H) 08/13/2023    GPT 19 08/13/2023    ALBUMIN 1.4 (L) 08/13/2023    GFRNA 42 (L) 01/27/2023    INTAC 91.2 (H) 10/14/2020    LACTA 1.5 08/08/2023       Urinalysis  Lab Results   Component Value Date    URBC Small (A) 08/10/2023    UKET Negative 08/10/2023    USPG 1.013 08/10/2023    UPROT Negative 08/10/2023    WBC 12.1 (H) 08/14/2023    UWBC Moderate (A) 08/10/2023    RBC 3.42 (L) 08/14/2023    UBILI Negative 08/10/2023    UPH 5.0 08/10/2023    UROB 0.2 08/10/2023    UBACTR Present (A) 03/27/2022       Imaging  No results found for any visits on 08/08/23 (from the past 72 hour(s)).    EKG  Encounter Date: 08/08/23   Electrocardiogram 12-Lead   Result Value    Systolic Blood Pressure 111    Diastolic Blood Pressure 53    Ventricular Rate EKG/Min (BPM) 64    Atrial Rate (BPM) 64    HI-Interval (MSEC) 166    QRS-Interval (MSEC) 84    QT-Interval (MSEC) 454    QTc 468    P Axis (Degrees) 41    R Axis (Degrees) 31    T Axis (Degrees) 18    REPORT TEXT      Normal sinus rhythm  with sinus arrhythmia  Normal ECG  When compared with ECG of  19-OCT-2021 19:56,  Nonspecific T wave abnormality no longer evident in  Anterolateral leads  Confirmed by MD NEETA, CLARKE ([id number]),  Denise Cunha ([id number]) on 8/10/2023 6:56:03 AM       Microbiology (Cultures)  NA    Assessment & Plan     Cheo Giron is a 77 year old male with a PMHx of asthma, COPD, HTN, CAD, Afib (not on AC), CKD,  MI s/p CABG (1993, follows Dr. Palacios), s/p dual chamber pacemaker/ICD (successful interogation 7/7/23), recent hip fracture status post revision (7/7/2023),chronic indwelling sears admitted on 8/8/2023 from [facility] for SOB.    Acute  Problems    Hypoactive delirium, multi-factorial   Metabolic encephalopathy  Waxing and waning, CAM 3. POA finally contacted: Osito ([phone]). Has a POA in his paper charts from [facility]. Per his facility, these individuals have not been activated as POA. They have been quite difficult to reach.   -- geriatrics consultation, recommendations very much appreciated   - will reduce gabapentin to 200mg for slow wean   - keep mirtazapine for now, will continue discontinuing tomorrow   - await MoCA per geriatrics   - await assessment of his anti-depressives   -- conservative measures in place  -- reaching out to POA this evening, may work on activating if necessary  -- palliative consult 8/14, they will see him 8/15    Upper gastrointestinal bleed  Melena  Diarrhea, acute, resolved  Hx chronic constipation  Patient has a history of constipation, recently started on Linzess outpatient.  Patient presented with abdominal pain on admission and has acute onset diarrhea. GI pathogen panel negative, C. Diff negative. This could have been induced by the antibiotics he was on or Linzess itself.  He developed melena withdrawn in his hospital course.  Throughout the stay, there was concern for abdominal infection verses sleeping source of his pain.    VSS. No hemodynamic compromise.     HGB (g/dL)   Date Value   08/14/2023 8.4 (L)   08/13/2023 8.1 (L)   08/12/2023 8.8 (L)   08/12/2023 9.7 (L)   08/12/2023 8.6 (L)   -- daily CBC  -- can do H&H stat if clinical change  -- Clear liquid diet  -- transfuse for Hgb <7  -- PPI  -- D5/LR continuous 75 mL/hr  -- telemetry  -- GI following, have elected not to proceed with patient's EGD as he is not alert and oriented enough to consent.  -- PTA Linzess discontinued    Complicated MDRO urinary tract infection secondary to chronic indwelling sears (Proteus mirabilis)  Reactive leukocytosis, improving  Hx nephrolithiasis with frequent UTIs s/p bilateral ureteroscopy (Dr. Fitch). Complex  and frequent history of UTI.11/2021 [place] admission with MDRO Klebsiella started on meropenem dc on fosfomycin. Last UTI reported on Jul 25. Sears was exchanged in the ED. UA with moderate blood, 11-25 erythrocytes, leukocytes, leukocyte esterase positive. UCx with extensively resistant proteus mirabilis  UCx with MRDO+, Klebsiella spp.  -- gentamicin started x1, discontinued  -- ceftazidime-avibactam (Avycaz) x5 days.   -- ID is following, appreciate recommendations  -- isolation precautions for carbapenemase-producing MDRO infection  -- sears catheter cares    Acute on chronic hypoxemic respiratory failure secondary to restrictive lung disease  Restrictive lung disease/pulmonary fibrosis/ILD (dx PFT 2022, no home o2, Dr. Morris)   Former smoker  As an outpatient, he was started on Levaquin for concern of pneumonia, presents with increased respiratory symptoms on day of admission. Initially admitted for concerns of pneumonia. Later, ID and pulmonary consultants reviewed his CXR and believe his presenting symptoms to be from chronic hypoxemic respiratory failure from his known restrictive lung disease (PFT's 2022). 03/10/2022 patient completed PFT per request of Dr. Morris showing moderate restrictive pattern and severe decrease in diffusion capacity.  On admission O2 sat 88% on room air; improved to 96% with 2 L NC. S/p vancomycin and cefepime (8/8-8/9) for initial concerns of infection. Positive MRSA nasal swab. BNP 2238 (was 2213 in the past).    CXR with bilateral chronic infiltrates consistent with interstitial fibrosis and scarring.  Pulmonary edema and fluid overload appreciated.  -- albuterol  -- PTA Lasix  -- home O2 eval prior to discharge  -- * palliative care consultation placed today  -- supp O2, goal Spo2 >92  -- speech therapy following    Chronic Problems    Microcytic anemia, of chronic disease + acute illness + bleed  On admission, Hgb 10.4, MCV 72. Hgb downtrending since admission (in known bleed).  Retic count 23.9 low.  -- has been consented for blood products  -- no iron panel for now. He is receiving iron through RBC products  -- transfuse for Hgb <7    Paroxysmal AFib (followed Dr. Palacios)   S/p pacemaker/ICD   HFpEF (LVEF 68%, 2021); NYHA functional class 2-3  CAD s/p CABG x4 (2011 at [facility])   Orthostatic hypotension - pta midodrine   Not on anticoagulation likely due to high-risk of fall and debility.  2021 echo (LVEF 68%) and stress test unremarkable. Successful pacemaker/ICD interrogation July 2023 during preop clearance. BNP 2000, troponin negative.  -- PTA Multaq  -- Multaq has caused advancing pulmonary fibrosis; however the needs outweigh the risks to hold  -- telemetry    DAVID on CKD3a - resolved   On admission Cr 1.41.   -- continue fluids  -- I&Os, daily weight  -- watch for kidney injury (BUN) in setting of a dose of gentamicin  -- avoid nephrotoxic meds    Protein calorie malnutrition  Physical deconditioning   Recent left hip fracture s/p revision 7/2023  -- PT/OT following  -- ST following   -- social work following  -- geriatric consult - appreciate recommendations on how to handle a non reliable POA in the setting of patient with waxing waning mentation and worsening chronic conditions.    Tentative Discharge/Disposition  Code - Full  Diet - clear liquid  LDA - PIV, urinary cath  DVT ppx - HOLD   Disposition - continue in-patient    ID and pulmonology have reviewed chest x-ray from admission and agree that this is not PNA but rather a presentation of his known restrictive lung disease. We are managing his chronic respiratory failure in the setting of a UTI from chronic indwelling catheter complicated by new melena (8/10) with concern for GIB. Waxing waning mentation continues to be a barrier for further workup that requires pursuit of a high-risk nonemergent EGD. Social work is being consulted to aid in connecting the care team with the designated -  but not activated - POA.  Primary team is in  discussion of long-term goals.    Discharge Planning    Barriers to discharge: Patient is not medically ready and needs to remain in the hospital today due to gastro-intestinal bleeding, complicated UTI, delirium  Anticipated discharge destination: Assisted living  Expected Discharge Date: 8/15/2023        Attestation Statement     The patient was seen, evaluated and discussed with Zafar Chapman MD who agrees with the above findings and plan.         Inés Almanzar,   Internal Medicine PGY-II  Pager # [phone] // [phone]  8/14/2023 7:40 AM     Attending staffing note:    I have evaluated Mr. Giron and have discussed his status and plan of care with Dr. Almanzar.  I have reviewed and I agree with the above progress note.    Zafar Chapman MD  08/14/2023

## 2023-08-31 RX ORDER — NORETHINDRONE ACETATE AND ETHINYL ESTRADIOL AND FERROUS FUMARATE 1MG-20(21)
KIT ORAL
Qty: 84 TABLET | Refills: 0 | Status: SHIPPED | OUTPATIENT
Start: 2023-08-31

## 2023-11-06 RX ORDER — NORETHINDRONE ACETATE AND ETHINYL ESTRADIOL AND FERROUS FUMARATE 1MG-20(21)
KIT ORAL
Qty: 84 TABLET | Refills: 0 | OUTPATIENT
Start: 2023-11-06

## 2023-11-06 RX ORDER — NORETHINDRONE ACETATE AND ETHINYL ESTRADIOL 1MG-20(21)
KIT ORAL
Qty: 84 TABLET | Refills: 0 | Status: SHIPPED | OUTPATIENT
Start: 2023-11-06

## 2023-12-15 ENCOUNTER — OFFICE VISIT (OUTPATIENT)
Dept: OBSTETRICS AND GYNECOLOGY | Facility: CLINIC | Age: 28
End: 2023-12-15
Payer: COMMERCIAL

## 2023-12-15 VITALS
WEIGHT: 127.6 LBS | RESPIRATION RATE: 14 BRPM | HEIGHT: 62 IN | BODY MASS INDEX: 23.48 KG/M2 | SYSTOLIC BLOOD PRESSURE: 100 MMHG | DIASTOLIC BLOOD PRESSURE: 68 MMHG

## 2023-12-15 DIAGNOSIS — Z30.40 ENCOUNTER FOR REFILL OF PRESCRIPTION FOR CONTRACEPTION: ICD-10-CM

## 2023-12-15 DIAGNOSIS — Z01.419 WOMEN'S ANNUAL ROUTINE GYNECOLOGICAL EXAMINATION: Primary | ICD-10-CM

## 2023-12-15 RX ORDER — NORETHINDRONE ACETATE AND ETHINYL ESTRADIOL 1MG-20(21)
KIT ORAL
Qty: 84 TABLET | Refills: 4 | Status: SHIPPED | OUTPATIENT
Start: 2023-12-15

## 2023-12-15 NOTE — PROGRESS NOTES
"Subjective   Chief Complaint   Patient presents with    Annual Exam     No complaints     Monae May is a 28 y.o. year old  presenting to be seen for her annual exam. She is doing well and has no complaints.     Last pap 2022: NILM  HPV vaccine: does not desire at this time     SEXUAL Hx:  She is currently sexually active.  In the past year there there has been NO new sexual partners.    She would not like to be screened for STD's at today's exam.  Current birth control method: OCP (estrogen/progesterone).  She is happy with her current method of contraception and does not want to discuss alternative methods of contraception.  MENSTRUAL Hx:  No LMP recorded (lmp unknown). (Menstrual status: Oral contraceptives).  In the past 6 months her cycles have been absent.    Intermenstrual bleeding is absent.    Post-coital bleeding is absent.  Dysmenorrhea: none  PMS: none  Her cycles are not a source of concern for her that she wishes to discuss today.  HEALTH Hx:  She exercises regularly: yes. Cardio and weight lifting   She wears her seat belt: yes.  She has concerns about domestic violence: no    The following portions of the patient's history were reviewed and updated as appropriate:problem list, current medications, allergies, past family history, past medical history, past social history, and past surgical history.    Social History    Tobacco Use      Smoking status: Never      Smokeless tobacco: Never         Objective   /68 (BP Location: Right arm, Patient Position: Sitting, Cuff Size: Adult)   Resp 14   Ht 157.5 cm (62\")   Wt 57.9 kg (127 lb 9.6 oz)   LMP  (LMP Unknown)   Breastfeeding No   BMI 23.34 kg/m²     General:  well developed; well nourished  no acute distress   Skin:  No suspicious lesions seen   Breasts:  Examined in supine position  Symmetric without masses or skin dimpling  Nipples normal without inversion, lesions or discharge  There are no palpable axillary " nodes  Fibrocystic changes are present both breasts without a discrete mass   Pelvis: Clinical staff was present for exam  External genitalia:  normal appearance of the external genitalia including Bartholin's and [place]'s glands.  :  urethral meatus normal;  Vaginal:  normal pink mucosa without prolapse or lesions.  Cervix:  normal appearance. ectropian present;  Uterus:  normal size, shape and consistency.  Adnexa:  normal bimanual exam of the adnexa.  Rectal:  digital rectal exam not performed; anus visually normal appearing.        Assessment   Normal GYN exam   Contraception refill      Plan   Pap was not done today.  I explained to Monae that the recommendations for Pap smear interval in a low risk patient has lengthened to 3 years time.  I told Monae she still needs to be seen in our office yearly for a full physical including breast and pelvic exam.   Continue OCPs.  90-day supply with 1 year refill sent to listed pharmacy.  I discussed with Monae that she may be behind on needed vaccinations for HPV (Gardasil-9).  She may be able to obtain these vaccinations at her local pharmacy OR speak about obtaining them with her primary care.  If she does obtain her vaccines, I have asked Monae to let us know the date each vaccine was obtained so that her medical record could be updated in our system.  The importance of keeping all planned follow-up and taking all medications as prescribed was emphasized.  Follow up for annual exam in 1 year or sooner PRN     New Medications Ordered This Visit   Medications    norethindrone-ethinyl estradiol FE (Junel FE 1/20) 1-20 MG-MCG per tablet     Sig: Take 1 tablet by mouth daily continuous, skip the placebos.     Dispense:  84 tablet     Refill:  4     Must keep appointment on 12/15/2023.          This note was electronically signed.    April Lanier MD   December 15, 2023

## 2024-01-02 RX ORDER — FLUCONAZOLE 200 MG/1
200 TABLET ORAL DAILY
Qty: 2 TABLET | Refills: 0 | Status: SHIPPED | OUTPATIENT
Start: 2024-01-02 | End: 2024-01-04

## 2024-03-11 RX ORDER — NORETHINDRONE ACETATE AND ETHINYL ESTRADIOL 1MG-20(21)
KIT ORAL
Qty: 84 TABLET | Refills: 4 | Status: SHIPPED | OUTPATIENT
Start: 2024-03-11 | End: 2024-03-12 | Stop reason: SDUPTHER

## 2024-03-12 RX ORDER — NORETHINDRONE ACETATE AND ETHINYL ESTRADIOL 1MG-20(21)
KIT ORAL
Qty: 112 TABLET | Refills: 4 | Status: SHIPPED | OUTPATIENT
Start: 2024-03-12

## 2024-10-04 RX ORDER — ACETAMINOPHEN AND CODEINE PHOSPHATE 120; 12 MG/5ML; MG/5ML
1 SOLUTION ORAL DAILY
Qty: 28 TABLET | Refills: 12 | Status: SHIPPED | OUTPATIENT
Start: 2024-10-04 | End: 2025-10-04

## 2024-10-14 NOTE — PROGRESS NOTES
Monae TAYLOR Yadira  1995    There is no work phone number on file.      08/01/2018    [facility] CARDIOLOGY     Rhys aPyton MD  [address] [address]    Chief Complaint   Patient presents with   • Vasovagal syncope       Problem List:  1)Syncope  2)Palpitations   A) [employer] Heart Monitor- 15 day monitoring period March 2017- . NSR with ST, no atrial fibrillation or SVT. Pause of 3.3 sec at    B) 10/31/2016 Echo: IVSD 0.65, LVPW 0.64, LA 2.4 cm EF>55%, mild MR, no MVP    C) 48 hour Holter monitor 3/1/2018: Predominantly normal sinus rhythm, average heart rate 64 bpm, min 41 bpm, max 165 bpm, 3 pauses noted, longest episode 4 seconds, bisoprolol decreased to 2.5 mg daily   2) Asthma   3) Insomnia  4) Surgical History- tonsillectomy and adenoidectomy, wisdom tooth extraction, lymphadenectomy     Allergies  Allergies   Allergen Reactions   • Ceftin [Cefuroxime Axetil] Nausea And Vomiting       Current Medications    Current Outpatient Prescriptions:   •  albuterol (VENTOLIN HFA) 108 (90 BASE) MCG/ACT inhaler, Inhale 2 puffs As Needed for Wheezing., Disp: , Rfl:   •  ALPRAZolam (XANAX) 0.25 MG tablet, TAKE 1-2 TABLETS BY MOUTH AT BEDTIME, Disp: , Rfl: 0  •  amitriptyline (ELAVIL) 10 MG tablet, Take 10 mg by mouth 2 (Two) Times a Day., Disp: , Rfl: 0  •  bisoprolol (ZEBeta) 5 MG tablet, Take 0.5 tablets by mouth 2 (Two) Times a Day. (Patient taking differently: Take 2.5 mg by mouth Daily.), Disp: 30 tablet, Rfl: 6  •  droxidopa (NORTHERA) 300 MG capsule capsule, Take 2 capsules by mouth 3 (Three) Times a Day., Disp: 90 capsule, Rfl:   •  montelukast (SINGULAIR) 10 MG tablet, Take 10 mg by mouth every night., Disp: , Rfl:   •  Norethin-Eth Estrad-Fe Biphas (LO LOESTRIN FE PO), Take  by mouth., Disp: , Rfl:   •  QVAR 40 MCG/ACT inhaler, Inhale 2 puffs Daily., Disp: , Rfl: 0    History of Present Illness   HPI    Pt presents for follow up of vasovagal  "syncope, palpitations.  She is now on Northera and has noted a big improvement in her syncopal episodes.  She had 2 syncopal episodes in the last 4 or 5 months, which has improved.  Feels she is able to do much more than she was used to.  Last event monitoring showing pauses so bisoprolol was decreased to 2.5 mg daily.  She feels this helps during the day but does note is wearing off in the evenings and notes faster heart rates in the evening. Since the pt has seen us, pt denies any SOB, CP. Denies any hospitalizations or ER visits. Overall feels well. Tolerating medications without difficulty.    ROS:  General:  Denies fatigue, weight gain or loss  Cardiovascular:  Denies CP, PND, + syncope, + near syncope, edema or palpitations.  Pulmonary:  Denies PERKINS, cough, or wheezing    Vitals:    08/01/18 1036   BP: 110/72   BP Location: Left arm   Patient Position: Sitting   Pulse: 99   Weight: 45.5 kg (100 lb 3.2 oz)   Height: 154.9 cm (61\")       PE:  General: NAD  Neck: no JVD, no carotid bruits, no TM  Heart RRR, NL S1, S2, S4 present, no rubs, murmurs  Lungs: CTA, no wheezes, rhonchi, or rales  Abd: soft, non-tender, NL BS  Ext: No musculoskeletal deformities, no edema, cyanosis, or clubbing  Psych: normal mood and affect    Diagnostic Data:  Procedures    1. Vasovagal syncope    2. Sinus tachycardia        Plan:  1) Syncope  - Doing well. Much improved since starting Northera.  Continue present medications.   2) Sinus tachycardia:  - Overall controlled on zebata 2.5 mg daily    F/up in 12 months    Scribed for Zhou Go MD by Fátima Dunbar, APRN. 8/1/2018  10:52 AM     I, Zhou Go MD, personally performed the services described in this documentation as scribed by the above named individual in my presence, and it is both accurate and complete.  8/1/2018  11:21 AM        " The patient is a 69y Male complaining of

## 2024-10-31 ENCOUNTER — OFFICE VISIT (OUTPATIENT)
Age: 29
End: 2024-10-31
Payer: COMMERCIAL

## 2024-10-31 VITALS
TEMPERATURE: 98 F | HEIGHT: 62 IN | BODY MASS INDEX: 24.24 KG/M2 | SYSTOLIC BLOOD PRESSURE: 112 MMHG | WEIGHT: 131.7 LBS | DIASTOLIC BLOOD PRESSURE: 70 MMHG | HEART RATE: 69 BPM

## 2024-10-31 DIAGNOSIS — Q79.60 EHLERS-DANLOS SYNDROME: ICD-10-CM

## 2024-10-31 DIAGNOSIS — Z86.69 HISTORY OF MIGRAINE HEADACHES: ICD-10-CM

## 2024-10-31 DIAGNOSIS — M25.50 ARTHRALGIA OF MULTIPLE SITES: ICD-10-CM

## 2024-10-31 DIAGNOSIS — M24.9 HYPERMOBILITY OF JOINT: Primary | ICD-10-CM

## 2024-10-31 DIAGNOSIS — I73.00 RAYNAUD'S DISEASE WITHOUT GANGRENE: ICD-10-CM

## 2024-10-31 DIAGNOSIS — J45.909 ASTHMA, UNSPECIFIED ASTHMA SEVERITY, UNSPECIFIED WHETHER COMPLICATED, UNSPECIFIED WHETHER PERSISTENT: ICD-10-CM

## 2024-10-31 DIAGNOSIS — G90.A POTS (POSTURAL ORTHOSTATIC TACHYCARDIA SYNDROME): ICD-10-CM

## 2024-10-31 RX ORDER — MELOXICAM 7.5 MG/1
7.5 TABLET ORAL 2 TIMES DAILY PRN
Qty: 60 TABLET | Refills: 5 | Status: SHIPPED | OUTPATIENT
Start: 2024-10-31

## 2024-10-31 NOTE — ASSESSMENT & PLAN NOTE
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

## 2024-10-31 NOTE — PROGRESS NOTES
Office Visit       Date: 10/31/2024   Patient Name: Monae May  MRN: [id number]  YOB: 1995    Referring Physician: Rhys Payton,*     Chief Complaint:   Chief Complaint   Patient presents with    Joint Pain       History of Present Illness: Monae May is a 29 y.o. female with past medical history migraine headaches, POTS syndrome, Batsheva-Danlos who is here today consultation from her PCP for chronic joint pain, arthralgias.      She reports worsening chronic arthralgias affecting her hands, cervical lumbar spine and sacroiliac joints knees.  She does not notice any swollen joints.  No psoriasis.  She has very hypermobile joints.  Echocardiogram has been normal with her cardiologist Dr. Valentine.  She tried tizanidine muscle relaxant recently which helped too much with her musculoskeletal pain.  She had excessive sedation with it.  She has previously tried naproxen and ibuprofen.    She reports Raynaud's.  No fevers weight loss or night sweats.  No rash.  No malar rash or photosensitivity.  No oral nasal ulcers.  No pleurisy or pericarditis.  No renal hematologic abnormalities.  No clotting disorder.  No seizure disorder.  No iritis    She reports prior lab testing for rheumatoid arthritis at [facility] was negative years ago    She reports difficulty walking a flight of stairs      Subjective     Review of Systems: Review of Systems   Constitutional:  Negative for chills, fatigue, fever and unexpected weight loss.   HENT:  Positive for congestion, sinus pressure, sneezing and tinnitus. Negative for mouth sores and sore throat.    Eyes:  Positive for blurred vision. Negative for pain and redness.   Respiratory:  Positive for chest tightness and shortness of breath. Negative for cough.    Cardiovascular:  Positive for chest pain and palpitations.   Gastrointestinal:  Positive for GERD and indigestion. Negative for abdominal pain, blood in stool, diarrhea,  nausea and vomiting.   Endocrine: Positive for cold intolerance and heat intolerance. Negative for polydipsia and polyuria.   Genitourinary:  Negative for dysuria, genital sores and hematuria.   Musculoskeletal:  Positive for arthralgias, back pain, myalgias, neck pain and neck stiffness. Negative for joint swelling.   Skin:  Positive for rash and bruise.   Allergic/Immunologic: Positive for environmental allergies and food allergies. Negative for immunocompromised state.   Neurological:  Positive for dizziness, syncope, light-headedness and headache. Negative for seizures, weakness, numbness and memory problem.   Hematological:  Negative for adenopathy. Does not bruise/bleed easily.   Psychiatric/Behavioral:  Positive for sleep disturbance. Negative for depressed mood. The patient is not nervous/anxious.         Past Medical History:   Past Medical History:   Diagnosis Date    Abnormal ECG     Anxiety     Arrhythmia     Asthma     Chicken pox     Coronary artery disease     EDS (Batsheva-Danlos syndrome)     GERD (gastroesophageal reflux disease)     Insomnia     Migraine     Neurocardiogenic syncope 05/03/2017    POTS (postural orthostatic tachycardia syndrome)     Urinary tract infection        Past Surgical History:   Past Surgical History:   Procedure Laterality Date    ADENOIDECTOMY      LYMPHADENECTOMY      MOLE REMOVAL Left 01/2022    left breast    TONSILLECTOMY AND ADENOIDECTOMY      WISDOM TOOTH EXTRACTION         Family History:   Family History   Problem Relation Age of Onset    Thyroid disease Mother     Pulmonary embolism Mother     Hypertension Mother     Hypertension Father     Coronary artery disease Father 57        mI @ 57    Melanoma Father     Heart attack Father         2x    No Known Problems Brother     Kidney nephrosis Brother     Heart disease Maternal Grandmother     Aortic aneurysm Maternal Grandfather     Dementia Paternal Grandmother     COPD Paternal Grandmother     Heart disease  "Paternal Grandfather     Colon cancer Neg Hx     Colon polyps Neg Hx     Esophageal cancer Neg Hx        Social History:   Social History     Socioeconomic History    Marital status:     Number of children: 0   Tobacco Use    Smoking status: Never    Smokeless tobacco: Never   Vaping Use    Vaping status: Never Used   Substance and Sexual Activity    Alcohol use: No    Drug use: No    Sexual activity: Yes     Partners: Male     Birth control/protection: Birth control pill, Pill, OCP       Medications:   Current Outpatient Medications:     bisoprolol (ZEBeta) 5 MG tablet, bisoprolol fumarate 5 mg tablet  TAKE 1 TABLET BY MOUTH EVERY DAY, Disp: , Rfl:     escitalopram (LEXAPRO) 10 MG tablet, , Disp: , Rfl:     norethindrone (MICRONOR) 0.35 MG tablet, Take 1 tablet by mouth Daily., Disp: 28 tablet, Rfl: 12    meloxicam (MOBIC) 7.5 MG tablet, Take 1 tablet by mouth 2 (Two) Times a Day As Needed for Mild Pain., Disp: 60 tablet, Rfl: 5    Allergies:   Allergies   Allergen Reactions    Amoxicillin Other (See Comments)    Ceftin [Cefuroxime Axetil] Nausea And Vomiting    Doxycycline Other (See Comments)    Cefdinir Nausea Only and Other (See Comments)       I have reviewed and updated the patient's chief complaint, history of present illness, review of systems, past medical history, surgical history, family history, social history, medications and allergy list as appropriate.     Objective      Vital Signs:   Vitals:    10/31/24 1507   BP: 112/70   BP Location: Left arm   Patient Position: Sitting   Cuff Size: Adult   Pulse: 69   Temp: 98 °F (36.7 °C)   Weight: 59.7 kg (131 lb 11.2 oz)   Height: 157.5 cm (62.01\")   PainSc:   7     Body mass index is 24.08 kg/m².       Physical Exam:  Physical Exam   MUSCULOSKELETAL:   No peripheral synovitis.  No dactylitis.  No pitting of the nails.  Scattered tenderness throughout the finger joints but no synovitis.  No joint deformity.  No rheumatoid nodules or tophi  Joints are " "hypermobile.  She is able to touch thumb to forearm and can hyperextend her elbows and knees  Tender cervical lumbar spine and sacroiliac joints  Tender knees.  No warmth or effusion.  Scattered tender points present above and below the waist.    Complete joint exam was performed including the MCPs, PIPs, DIPs of the hands, wrists, elbows, shoulders, hips, knees and ankles.  No soft tissue swelling or tenderness is present except as above.    General: The patient is well-developed and well nourished. Cooperative, alert and oriented. Affect is normal. Hydration appears normal.   HEENT: Normocephalic and atraumatic. Lids and conjunctiva are normal. Pupils are equal and sclera are clear. Oropharynx is clear   NECK neck is supple without adenopathy, masses or thyromegaly.   CARDIOVASCULAR: Regular rate and rhythm. No murmurs, rubs or gallops   LUNGS: Effort is normal. Lungs are clear bilateral   ABDOMEN: Not examined  EXTREMITIES: Peripheral pulses are intact. No clubbing.   SKIN: No rashes. No subcutaneous nodules. No digital ulcers. No sclerodactyly.   NEUROLOGIC: Gait is normal. Strength testing is normal.  No focal neurologic deficits    Results Review:   Labs:    No results found for: \"GLUCOSE\", \"BUN\", \"CREATININE\", \"EGFRRESULT\", \"EGFR\", \"BCR\", \"K\", \"CO2\", \"CALCIUM\", \"PROTENTOTREF\", \"ALBUMIN\", \"BILITOT\", \"AST\", \"ALT\"  No results found for: \"WBC\", \"HGB\", \"HCT\", \"MCV\", \"PLT\"  No results found for: \"SEDRATE\"  No results found for: \"CRP\"  No results found for: \"QUANTIFERO\", \"QUANTITB1\", \"QUANTITB2\", \"QUANTIFERN\", \"QUANTIFERM\", \"QUANTITBGLDP\"  No results found for: \"RF\"  No results found for: \"HEPBSAG\", \"HEPAIGM\", \"HEPBIGMCORE\", \"HEPCVIRUSABY\"        Procedures    Assessment / Plan        Assessment & Plan  Arthralgia of multiple sites  .  No children.  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  -I suspect her chronic arthralgias are related to her hypermobile joints, possible EDS.  She may also have coexistent fibromyalgia and osteoarthritis which are associated with EDS and hypermobile joints  I do not find any evidence of inflammatory arthritis such as RA, PSA on exam.  No peripheral synovitis.  -Will screen for ankylosing spondylitis with HLA-B27 and sacroiliac joint x-rays.  No history of iritis or psoriasis  She has no features of lupus or connective tissue disease beyond Raynaud's  Low probability of underlying inflammatory rheumatic disease    -Recommend addition of meloxicam 7.5 mg once or twice daily as needed for arthralgias  Risks of NSAIDs discussed including GI upset, GI bleeding, renal or hepatic risks and the risk of cardiovascular disease and stroke.  Warned patient not to take other NSAIDs including over-the-counter NSAIDs  -Obtain x-rays today cervical spine, lumbar spine, sacroiliac joints  -Labs today as below  -Handout provided on meloxicam and Batsheva-Danlos syndrome  -Discussed that Batsheva-Danlos is a genetic condition, not an autoimmune disease  It is generally treated symptomatically with NSAIDs, physical therapy  Sometimes involvement of blood vessels including aortic aneurysms can be seen with EDS.  She follows with cardiology Dr. Valentine and reports no evidence of such on cardiac imaging  Return to clinic 6 months    Orders:    CBC Auto Differential; Future    Comprehensive Metabolic Panel; Future    Sedimentation Rate; Future    C-reactive Protein; Future    Urinalysis With Culture If Indicated -;  Future    HLA-B27 Antigen; Future    Rheumatoid Factor; Future    Cyclic Citrul Peptide Antibody, IgG / IgA; Future    XR Sacroiliac Joints 3+ View    XR Spine Cervical 2 View    XR Spine Lumbar 2 or 3 View    ESPERANZA by IFA, Reflex 9-biomarkers profile; Future    XR Hand 2 View Bilateral    Hypermobility of joint  Suspect Batsheva-Danlos syndrome    Orders:    CBC Auto Differential; Future    Comprehensive Metabolic Panel; Future    Sedimentation Rate; Future    C-reactive Protein; Future    Urinalysis With Culture If Indicated -; Future    HLA-B27 Antigen; Future    Rheumatoid Factor; Future    Cyclic Citrul Peptide Antibody, IgG / IgA; Future    XR Sacroiliac Joints 3+ View    XR Spine Cervical 2 View    XR Spine Lumbar 2 or 3 View    ESPERANZA by IFA, Reflex 9-biomarkers profile; Future    Batsheva-Danlos syndrome  -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 Valentine       POTS (postural orthostatic tachycardia syndrome)  -Reported history of.    Follows with cardiology Dr. Valentine    Raynaud's disease without gangrene  Recommend avoidance of cold, smoke, caffeine, stress which can exacerbate Raynaud's       Asthma, unspecified asthma severity, unspecified whether complicated, unspecified whether persistent      History of migraine headaches  -Has consulted with [facility]      TIME SPENT: I spent 45 minutes caring for the patient on this date of service.  This time includes time spent by me in the following activities: Preparing for the visit, obtaining records, reviewing/ordering tests and independently reviewing results, performing a medically appropriate history/exam, counseling and educating the patient/family/caregiver, ordering medications, tests, or procedures, and documenting information in the medical record.    Follow Up:   Return in 6 months (on  4/30/2025).        Joaquin Harding MD  [facility] [place] [facility]

## 2024-10-31 NOTE — ASSESSMENT & PLAN NOTE
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 Valentine

## 2024-10-31 NOTE — ASSESSMENT & PLAN NOTE
.  No children.  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  -I suspect her chronic arthralgias are related to her hypermobile joints, possible EDS.  She may also have coexistent fibromyalgia and osteoarthritis which are associated with EDS and hypermobile joints  I do not find any evidence of inflammatory arthritis such as RA, PSA on exam.  No peripheral synovitis.  -Will screen for ankylosing spondylitis with HLA-B27 and sacroiliac joint x-rays.  No history of iritis or psoriasis  She has no features of lupus or connective tissue disease beyond Raynaud's  Low probability of underlying inflammatory rheumatic disease    -Recommend addition of meloxicam 7.5 mg once or twice daily as needed for arthralgias  Risks of NSAIDs discussed including GI upset, GI bleeding, renal or hepatic risks and the risk of cardiovascular disease and stroke.  Warned patient not to take other NSAIDs including over-the-counter NSAIDs  -Obtain x-rays today cervical spine, lumbar spine, sacroiliac joints  -Labs today as below  -Handout provided on meloxicam and Batsheva-Danlos syndrome  -Discussed that Batsheva-Danlos is a genetic condition, not an autoimmune disease  It is generally treated symptomatically with NSAIDs, physical therapy  Sometimes involvement of blood vessels including aortic aneurysms can be seen with EDS.  She follows with cardiology Dr. Valentine and reports no evidence of such on cardiac imaging  Return to clinic 6  Assessment/Plan:  1  ESRD (end stage renal disease) ([facility] [address] )    2  Type 2 diabetes mellitus with chronic kidney disease on chronic dialysis, with long-term current use of insulin ([facility])  -     Continuous Blood Gluc Sensor (FreeStyle Jessica 14 Day Sensor) MISC; Use 1 each daily  -     Continuous Blood Gluc  (FreeStyle Jessica 14 Day [place]) LAUREN; Use 1 each daily  -     POCT hemoglobin A1c    3  Diabetic retinopathy of both eyes associated with type 2 diabetes mellitus, macular edema presence unspecified, unspecified retinopathy severity ([facility])  -     Continuous Blood Gluc Sensor (FreeStyle Jessica 14 Day Sensor) MISC; Use 1 each daily  -     Continuous Blood Gluc  (FreeStyle Jessica 14 Day [place]) LAUREN; Use 1 each daily    4  Essential hypertension  -     NIFEdipine (ADALAT CC) 60 MG 24 hr tablet; Take 1 tablet (60 mg total) by mouth daily  -     furosemide (LASIX) 80 mg tablet; Take 1 tablet (80 mg total) by mouth daily  -     aspirin (Aspirin Low Dose) 81 mg EC tablet; Take 1 tablet (81 mg total) by mouth daily    5  CKD (chronic kidney disease) stage 4, GFR 15-29 ml/min ([facility])  -     aspirin (Aspirin Low Dose) 81 mg EC tablet; Take 1 tablet (81 mg total) by mouth daily    6  Uncontrolled type 2 diabetes mellitus with hyperglycemia, with long-term current use of insulin ([facility])  -     aspirin (Aspirin Low Dose) 81 mg EC tablet; Take 1 tablet (81 mg total) by mouth daily    7  Hyperlipidemia, unspecified hyperlipidemia type  -     atorvastatin (LIPITOR) 40 mg tablet; Take 1 tablet (40 mg total) by mouth daily    8  Olecranon bursitis of right elbow    9  Uncontrolled secondary diabetes mellitus with stage 5 CKD (GFR<15) ([facility])  -     insulin aspart (NovoLOG) 100 Units/mL injection pen; 10U with breakfast and lunch, 10-14 U with dinner  -     insulin glargine (Basaglar KwikPen) 100 units/mL injection pen; Inject 30 Units under the skin daily    10   Positive QuantiFERON-TB Gold test     blood pressure was elevated, his nifedipine was recently reduced due to hypertensive episodes after dialysis  Advised to monitor at home  Blood sugars are much better controlled  No episodes of hypoglycemia  Continue current regimen  Will try to get Lester which may be better given his blindness, chronic kidney disease  Diabetic gastroparesis  Encouraged to reschedule colonoscopy     Elbow brace/Ace wrap for olecranon bursitis  No signs of acute infection     A completed COVID vaccination     Continue isoniazid  Has regular follow-up of CBC and hepatic function panel      Subjective:   Chief Complaint   Patient presents with    Follow-up     Needs foot exam, eye exam (states it was done in April - form sent) & colonoscopy (ordered 4 14 2021)  Patient ID: Velia Momin is a 61 y o  male  He comes in with his sister for follow-up of hypertension, diabetes, end-stage renal disease and dialysis  overall stable  Chief complain is a swelling on his right elbow  No falls or injuries      The following portions of the patient's history were reviewed and updated as appropriate: current medications, past medical history, past social history and past surgical history      PHQ-9 Depression Screening    PHQ-9:   Frequency of the following problems over the past two weeks:      Little interest or pleasure in doing things: 0 - not at all  Feeling down, depressed, or hopeless: 0 - not at all  PHQ-2 Score: 0           Current Outpatient Medications:     aspirin (Aspirin Low Dose) 81 mg EC tablet, Take 1 tablet (81 mg total) by mouth daily, Disp: 90 tablet, Rfl: 1    atorvastatin (LIPITOR) 40 mg tablet, Take 1 tablet (40 mg total) by mouth daily, Disp: 90 tablet, Rfl: 1    Blood Glucose Monitoring Suppl (ONE TOUCH ULTRA 2) w/Device KIT, by Does not apply route 3 (three) times a day, Disp: 1 each, Rfl: 0    brimonidine tartrate 0 2 % ophthalmic solution, Administer 1 drop into the left eye 2 (two) times a day, Disp: , Rfl: months    Orders:    CBC Auto Differential; Future    Comprehensive Metabolic Panel; Future    Sedimentation Rate; Future    C-reactive Protein; Future    Urinalysis With Culture If Indicated -; Future    HLA-B27 Antigen; Future    Rheumatoid Factor; Future    Cyclic Citrul Peptide Antibody, IgG / IgA; Future    XR Sacroiliac Joints 3+ View    XR Spine Cervical 2 View    XR Spine Lumbar 2 or 3 View    ESPERANZA by IFA, Reflex 9-biomarkers profile; Future    XR Hand 2 View Bilateral       Carboxymethylcellul-Glycerin (CLEAR EYES FOR DRY EYES OP), Apply 2 drops to eye 2 (two) times a day, Disp: , Rfl:     cinacalcet (SENSIPAR) 30 mg tablet, Take 30 mg by mouth daily, Disp: , Rfl:     Continuous Blood Gluc  (FreeStyle Jessica 14 Day [place]) LAUREN, Use 1 each daily, Disp: 1 each, Rfl: 0    Continuous Blood Gluc Sensor (FreeStyle Jessica 14 Day Sensor) MISC, Use 1 each daily, Disp: 2 each, Rfl: 1    cyclobenzaprine (FLEXERIL) 5 mg tablet, Take 1 tablet (5 mg total) by mouth daily at bedtime as needed for muscle spasms, Disp: 30 tablet, Rfl: 1    doxazosin (CARDURA) 4 mg tablet, Take 1 tablet (4 mg total) by mouth daily at bedtime, Disp: 30 tablet, Rfl: 0    furosemide (LASIX) 80 mg tablet, Take 1 tablet (80 mg total) by mouth daily, Disp: 90 tablet, Rfl: 1    glucose blood (FREESTYLE LITE) test strip, Test blood sugars three times daily  , Disp: 100 each, Rfl: 2    insulin aspart (NovoLOG) 100 Units/mL injection pen, 10U with breakfast and lunch, 10-14 U with dinner, Disp: 5 pen, Rfl: 2    insulin glargine (Basaglar KwikPen) 100 units/mL injection pen, Inject 30 Units under the skin daily, Disp: 15 mL, Rfl: 2    isoniazid (NYDRAZID) 300 mg tablet, Take 1 tablet (300 mg total) by mouth daily, Disp: 30 tablet, Rfl: 8    labetalol (NORMODYNE) 200 mg tablet, Take 1 tablet (200 mg total) by mouth every 12 (twelve) hours, Disp: 60 tablet, Rfl: 0    Lancets (freestyle) lancets, Use as instructed, Disp: 100 each, Rfl: 1    Lancets MISC, Use 2 (two) times a day, Disp: 100 each, Rfl: 0    LOKELMA 10 g PACK, , Disp: , Rfl:     NIFEdipine (ADALAT CC) 60 MG 24 hr tablet, Take 1 tablet (60 mg total) by mouth daily, Disp: 30 tablet, Rfl: 0    ONE TOUCH LANCETS MISC, by Does not apply route 3 (three) times a day, Disp: 100 each, Rfl: 1    pantoprazole (PROTONIX) 20 mg tablet, Take 1 tablet (20 mg total) by mouth daily, Disp: 30 tablet, Rfl: 3    polyethylene glycol (MIRALAX) 17 g packet, Take 17 g by mouth daily, Disp: 30 each, Rfl: 1    prednisoLONE acetate (PRED FORTE) 1 % ophthalmic suspension, Administer 1 drop into the left eye 2 (two) times a day , Disp: , Rfl: 0    pyridoxine (VITAMIN B6) 50 mg tablet, Take 1 tablet (50 mg total) by mouth daily, Disp: 30 tablet, Rfl: 6    sevelamer carbonate (RENVELA) 800 mg tablet, Take 800 mg by mouth 3 (three) times a day with meals, Disp: , Rfl:     Review of Systems   Constitutional: Negative for fatigue, fever and unexpected weight change  HENT: Negative for ear pain, hearing loss and sore throat  Eyes: Negative for pain and discharge  Respiratory: Negative for cough, chest tightness and shortness of breath  Cardiovascular: Negative for chest pain and palpitations  Gastrointestinal: Negative for abdominal pain, blood in stool, constipation, diarrhea and nausea  Genitourinary: Negative for dysuria, frequency and hematuria  Musculoskeletal: Positive for arthralgias  Negative for joint swelling  Skin: Negative for rash  Allergic/Immunologic: Negative for immunocompromised state  Neurological: Negative for dizziness and headaches  Hematological: Negative for adenopathy  Psychiatric/Behavioral: Negative for confusion and sleep disturbance  Objective:  /86   Pulse 86   Temp 97 6 °F (36 4 °C)   Resp 12   Ht 5' 10" (1 778 m)   Wt 79 4 kg (175 lb)   BMI 25 11 kg/m²      Physical Exam  Vitals signs and nursing note reviewed  Constitutional:       Appearance: Normal appearance  He is well-developed  HENT:      Head: Normocephalic and atraumatic  Right Ear: Tympanic membrane normal       Left Ear: Tympanic membrane normal       Nose: Nose normal    Eyes:      General:         Right eye: No discharge  Left eye: No discharge  Conjunctiva/sclera: Conjunctivae normal       Pupils: Pupils are equal, round, and reactive to light  Neck:      Musculoskeletal: Normal range of motion and neck supple  Thyroid: No thyromegaly  Cardiovascular:      Rate and Rhythm: Normal rate and regular rhythm  Chest Wall: PMI is not displaced  Heart sounds: Normal heart sounds, S1 normal and S2 normal  No murmur  Pulmonary:      Effort: Pulmonary effort is normal  No accessory muscle usage or respiratory distress  Breath sounds: Normal breath sounds  No rhonchi or rales  Abdominal:      General: Bowel sounds are normal       Palpations: Abdomen is soft  There is no shifting dullness  Tenderness: There is no abdominal tenderness  There is no rebound  Musculoskeletal: Normal range of motion  General: No tenderness  Right elbow: He exhibits swelling  He exhibits normal range of motion  Comments:  Swelling on the right olecranon bursa   Lymphadenopathy:      Cervical: No cervical adenopathy  Skin:     General: Skin is warm  Findings: No rash  Neurological:      Mental Status: He is alert     Psychiatric:         Speech: Speech normal            Recent Results (from the past 1008 hour(s))   CBC and differential    Collection Time: 05/03/21 10:43 AM   Result Value Ref Range    WBC 6 02 4 31 - 10 16 Thousand/uL    RBC 4 37 3 88 - 5 62 Million/uL    Hemoglobin 12 3 12 0 - 17 0 g/dL    Hematocrit 39 8 36 5 - 49 3 %    MCV 91 82 - 98 fL    MCH 28 1 26 8 - 34 3 pg    MCHC 30 9 (L) 31 4 - 37 4 g/dL    RDW 14 4 11 6 - 15 1 %    MPV 9 5 8 9 - 12 7 fL    Platelets 093 542 - 311 Thousands/uL    nRBC 0 /100 WBCs    Neutrophils Relative 57 43 - 75 %    Immat GRANS % 0 0 - 2 %    Lymphocytes Relative 28 14 - 44 %    Monocytes Relative 9 4 - 12 %    Eosinophils Relative 5 0 - 6 %    Basophils Relative 1 0 - 1 %    Neutrophils Absolute 3 42 1 85 - 7 62 Thousands/µL    Immature Grans Absolute 0 01 0 00 - 0 20 Thousand/uL    Lymphocytes Absolute 1 69 0 60 - 4 47 Thousands/µL    Monocytes Absolute 0 54 0 17 - 1 22 Thousand/µL    Eosinophils Absolute 0 32 0 00 - 0 61 Thousand/µL    Basophils Absolute 0  04 0 00 - 0 10 Thousands/µL   Hepatic function panel    Collection Time: 05/03/21 10:43 AM   Result Value Ref Range    Total Bilirubin 0 69 0 20 - 1 00 mg/dL    Bilirubin, Direct 0 17 0 00 - 0 20 mg/dL    Alkaline Phosphatase 81 46 - 116 U/L    AST 12 5 - 45 U/L    ALT 10 (L) 12 - 78 U/L    Total Protein 8 1 6 4 - 8 2 g/dL    Albumin 3 8 3 5 - 5 0 g/dL   Protime-INR    Collection Time: 05/03/21 10:43 AM   Result Value Ref Range    Protime 14 4 11 6 - 14 5 seconds    INR 1 12 0 84 - 8 90   Basic metabolic panel    Collection Time: 05/03/21 10:43 AM   Result Value Ref Range    Sodium 135 (L) 136 - 145 mmol/L    Potassium 4 4 3 5 - 5 3 mmol/L    Chloride 99 (L) 100 - 108 mmol/L    CO2 29 21 - 32 mmol/L    ANION GAP 7 4 - 13 mmol/L    BUN 60 (H) 5 - 25 mg/dL    Creatinine 13 20 (H) 0 60 - 1 30 mg/dL    Glucose, Fasting 158 (H) 65 - 99 mg/dL    Calcium 7 2 (L) 8 3 - 10 1 mg/dL    eGFR 4 ml/min/1 73sq m   Basic metabolic panel    Collection Time: 05/07/21  7:42 AM   Result Value Ref Range    Sodium 140 136 - 145 mmol/L    Potassium 4 3 3 5 - 5 3 mmol/L    Chloride 98 (L) 100 - 108 mmol/L    CO2 30 21 - 32 mmol/L    ANION GAP 12 4 - 13 mmol/L    BUN 48 (H) 5 - 25 mg/dL    Creatinine 10 22 (H) 0 60 - 1 30 mg/dL    Glucose 165 (H) 65 - 140 mg/dL    Glucose, Fasting 165 (H) 65 - 99 mg/dL    Calcium 7 5 (L) 8 3 - 10 1 mg/dL    eGFR 6 ml/min/1 73sq m   CBC and differential    Collection Time: 05/07/21  7:44 AM   Result Value Ref Range    WBC 6 45 4 31 - 10 16 Thousand/uL    RBC 4 19 3 88 - 5 62 Million/uL    Hemoglobin 12 0 12 0 - 17 0 g/dL    Hematocrit 37 5 36 5 - 49 3 %    MCV 90 82 - 98 fL    MCH 28 6 26 8 - 34 3 pg    MCHC 32 0 31 4 - 37 4 g/dL    RDW 14 1 11 6 - 15 1 %    MPV 8 5 (L) 8 9 - 12 7 fL    Platelets 247 785 - 899 Thousands/uL    nRBC 0 /100 WBCs    Neutrophils Relative 66 43 - 75 %    Immat GRANS % 0 0 - 2 %    Lymphocytes Relative 17 14 - 44 %    Monocytes Relative 14 (H) 4 - 12 %    Eosinophils Relative 2 0 - 6 %    Basophils Relative 1 0 - 1 %    Neutrophils Absolute 4 23 1 85 - 7 62 Thousands/µL    Immature Grans Absolute 0 01 0 00 - 0 20 Thousand/uL    Lymphocytes Absolute 1 12 0 60 - 4 47 Thousands/µL    Monocytes Absolute 0 89 0 17 - 1 22 Thousand/µL    Eosinophils Absolute 0 15 0 00 - 0 61 Thousand/µL    Basophils Absolute 0 05 0 00 - 0 10 Thousands/µL   Hepatic function panel    Collection Time: 05/07/21  7:45 AM   Result Value Ref Range    Total Bilirubin 0 57 0 20 - 1 00 mg/dL    Bilirubin, Direct 0 15 0 00 - 0 20 mg/dL    Alkaline Phosphatase 83 46 - 116 U/L    AST 19 5 - 45 U/L    ALT 10 (L) 12 - 78 U/L    Total Protein 8 1 6 4 - 8 2 g/dL    Albumin 3 8 3 5 - 5 0 g/dL   Basic metabolic panel    Collection Time: 05/10/21  9:02 AM   Result Value Ref Range    Sodium 140 136 - 145 mmol/L    Potassium 5 0 3 5 - 5 3 mmol/L    Chloride 98 (L) 100 - 108 mmol/L    CO2 31 21 - 32 mmol/L    ANION GAP 11 4 - 13 mmol/L    BUN 56 (H) 5 - 25 mg/dL    Creatinine 13 97 (H) 0 60 - 1 30 mg/dL    Glucose, Fasting 113 (H) 65 - 99 mg/dL    Calcium 7 5 (L) 8 3 - 10 1 mg/dL    eGFR 4 ml/min/1 73sq m   POCT hemoglobin A1c    Collection Time: 06/07/21 11:17 AM   Result Value Ref Range    Hemoglobin A1C 6 7 (A) 6 5   ]    Procedure: Nm Gastric Emptying    Result Date: 4/14/2021  Narrative: GASTRIC EMPTYING STUDY INDICATION: E11 22: Type 2 diabetes mellitus with diabetic chronic kidney disease N18 6: End stage renal disease Z99 2: Dependence on renal dialysis Z79 4: Long term (current) use of insulin R68 81: Early satiety COMPARISON:  None available TECHNIQUE:   The study was performed following the oral administration of 1 0 mCi Tc-99m sulfur colloid combined with scrambled eggs, as part of a standard meal   Following the meal, one minute anterior and posterior images were obtained immediately and at 0 25 hours, 0 5 hour, 1 hour, 1 5 hour, 2 hour, 3 hour and 4 hour intervals from the time of ingestion    Geometric mean analyses were then performed  As of March 1, 2016, this gastric emptying protocol has been modified and updated  The gastric emptying times and the normal values reported below reflect the change in protocol  FINDINGS: Gastric emptying at 0 5 hours = 8 (N < 30%) Gastric emptying at 1 hour = 16 % (N = 10 - 70%) Gastric emptying at 2 hours = 33 % (N = > 40%) Gastric emptying at 3 hours = 49 % (N = > 70%) Gastric emptying at 4 hours = 65 % (N = >90%) Linear T-1/2 = 186 minutes Gastric emptying is delayed at the 2, 3 and 4 hour time points  Impression: 1  Delayed rate of gastric emptying   Workstation performed: [id number]

## 2024-12-16 ENCOUNTER — TELEPHONE (OUTPATIENT)
Dept: OBSTETRICS AND GYNECOLOGY | Facility: CLINIC | Age: 29
End: 2024-12-16

## 2024-12-16 NOTE — TELEPHONE ENCOUNTER
Caller: Monae May    Relationship:  Self    Best call back number: [phone]    PATIENT CALLED REQUESTING TO CANCEL SAME DAY APPT.    Did the patient call AFTER the start time of their scheduled appointment?  []YES  [x]NO    Was the patient's appointment rescheduled? [x]YES  []NO    Any additional information: PT STARTED CYCLE - R/S'd FOR 12/20/24

## 2024-12-19 NOTE — PROGRESS NOTES
"Subjective   Chief Complaint   Patient presents with    Gynecologic Exam     Annual     Monae May is a 29 y.o. year old  presenting to be seen for her annual exam. She is doing well, and has no complaints. She reports testing positive for one copy of a clotting gene, but cannot remember which one.     Last pap 2022: NILM  HPV vaccine: not interested in receiving it     SEXUAL Hx:  She is currently sexually active.  In the past year there there has been NO new sexual partners.    Condoms are never used.  She would not like to be screened for STD's at today's exam.  Current birth control method: OCP (progestin only).  She is happy with her current method of contraception and does not want to discuss alternative methods of contraception.  MENSTRUAL Hx:  Patient's last menstrual period was 2024 (exact date).  In the past 6 months her cycles have been regular.  Her menstrual flow is typically light.   Each month on average there are roughly 1-2 day(s) of very heavy flow.  Intermenstrual bleeding is absent.    Post-coital bleeding is absent.  Dysmenorrhea: none  PMS: is not affecting her activities of daily living  Her cycles are not a source of concern for her that she wishes to discuss today.  HEALTH Hx:  She exercises regularly: yes. Pilates at home!   She wears her seat belt: yes.  She has concerns about domestic violence: no.    The following portions of the patient's history were reviewed and updated as appropriate:problem list, current medications, allergies, past family history, past medical history, past social history, and past surgical history.    Social History    Tobacco Use      Smoking status: Never      Smokeless tobacco: Never         Objective   BP 90/68 (BP Location: Right arm, Patient Position: Sitting, Cuff Size: Adult)   Ht 157.5 cm (62.01\")   Wt 59.5 kg (131 lb 3.2 oz)   LMP 2024 (Exact Date)   Breastfeeding No   BMI 23.99 kg/m²     General:  well developed; well " nourished  no acute distress  mentation appropriate   Breasts:  Examined in supine position  Symmetric without masses or skin dimpling  Nipples normal without inversion, lesions or discharge  There are no palpable axillary nodes  Fibrocystic changes are present both breasts without a discrete mass   Pelvis: Clinical staff was present for exam  External genitalia:  normal appearance of the external genitalia including Bartholin's and Maxatawny's glands.  :  urethral meatus normal;  Vaginal:  normal pink mucosa without prolapse or lesions.  Cervix:  normal appearance. ectropian present;  Uterus:  normal size, shape and consistency.  Adnexa:  normal bimanual exam of the adnexa.  Rectal:  digital rectal exam not performed; anus visually normal appearing.        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  I explained to Monae that the recommendations for Pap smear interval in a low risk patient has lengthened to 3 years time.  I told Monae she still needs to be seen in our office yearly for a full physical including breast and pelvic exam.   Continue POP. 90 day supply with 1 year of refills sent to listed pharmacy.   Encourage patient to reach out when she finds the paperwork for which gene she tested positive for, for documentation purposes.  I discussed with Monae that she may be behind on needed vaccinations for HPV (Gardasil-9).  She may be able to obtain these vaccinations at her local pharmacy OR speak about obtaining them with her primary care.  If she does obtain her vaccines, I have asked Monae to let us know the date each vaccine was obtained so that her medical record could be updated in our system.  The importance of keeping all planned follow-up and taking all medications as prescribed was emphasized.  Follow up for annual exam in 1 year or sooner PRN     New Medications Ordered This Visit    Medications    norethindrone (MICRONOR) 0.35 MG tablet     Sig: Take 1 tablet by mouth Daily.     Dispense:  84 tablet     Refill:  4          This note was electronically signed.    April Lanier MD   December 20, 2024

## 2024-12-20 ENCOUNTER — OFFICE VISIT (OUTPATIENT)
Dept: OBSTETRICS AND GYNECOLOGY | Facility: CLINIC | Age: 29
End: 2024-12-20
Payer: COMMERCIAL

## 2024-12-20 VITALS
HEIGHT: 62 IN | SYSTOLIC BLOOD PRESSURE: 90 MMHG | WEIGHT: 131.2 LBS | BODY MASS INDEX: 24.14 KG/M2 | DIASTOLIC BLOOD PRESSURE: 68 MMHG

## 2024-12-20 DIAGNOSIS — Z01.419 WOMEN'S ANNUAL ROUTINE GYNECOLOGICAL EXAMINATION: Primary | ICD-10-CM

## 2024-12-20 DIAGNOSIS — Z30.40 ENCOUNTER FOR REFILL OF PRESCRIPTION FOR CONTRACEPTION: ICD-10-CM

## 2024-12-20 RX ORDER — ACETAMINOPHEN AND CODEINE PHOSPHATE 120; 12 MG/5ML; MG/5ML
1 SOLUTION ORAL DAILY
Qty: 84 TABLET | Refills: 4 | Status: SHIPPED | OUTPATIENT
Start: 2024-12-20 | End: 2025-12-20

## 2024-12-20 RX ORDER — IVABRADINE 5 MG/1
5 TABLET, FILM COATED ORAL DAILY
COMMUNITY
Start: 2024-11-06

## 2024-12-30 ENCOUNTER — PATIENT MESSAGE (OUTPATIENT)
Dept: OBSTETRICS AND GYNECOLOGY | Facility: CLINIC | Age: 29
End: 2024-12-30
Payer: COMMERCIAL

## 2025-03-14 ENCOUNTER — TELEPHONE (OUTPATIENT)
Dept: OBSTETRICS AND GYNECOLOGY | Facility: CLINIC | Age: 30
End: 2025-03-14
Payer: COMMERCIAL

## 2025-03-14 NOTE — TELEPHONE ENCOUNTER
Patient would like a refill of her birth control and would like for it to be sent to [facility] pharmacy on [address]

## 2025-05-29 ENCOUNTER — OFFICE VISIT (OUTPATIENT)
Dept: OBSTETRICS AND GYNECOLOGY | Facility: CLINIC | Age: 30
End: 2025-05-29
Payer: COMMERCIAL

## 2025-05-29 VITALS
DIASTOLIC BLOOD PRESSURE: 68 MMHG | WEIGHT: 112.4 LBS | HEIGHT: 62 IN | SYSTOLIC BLOOD PRESSURE: 90 MMHG | BODY MASS INDEX: 20.68 KG/M2

## 2025-05-29 DIAGNOSIS — Z30.41 ENCOUNTER FOR SURVEILLANCE OF CONTRACEPTIVE PILLS: Primary | ICD-10-CM

## 2025-05-29 RX ORDER — FAMOTIDINE 10 MG
20 TABLET ORAL 2 TIMES DAILY
COMMUNITY
Start: 2025-05-13

## 2025-05-29 NOTE — PROGRESS NOTES
"Subjective   Chief Complaint   Patient presents with    Follow-up     Med check: Slynd, pt states that she is doing much better.     Monae May is a 30 y.o. year old .  No LMP recorded (lmp unknown). (Menstrual status: Oral contraceptives).  She presents for follow up after POP switch to Slynd from Micronor due to breakthrough bleeding. She reports doing much better on this medication, and is happy with it. She had one period that was spotting only, and has had none since then.     The following portions of the patient's history were reviewed and updated as appropriate:current medications, allergies, past medical history, and past surgical history    Social History    Tobacco Use      Smoking status: Never      Smokeless tobacco: Never         Objective   BP 90/68 (BP Location: Left arm, Patient Position: Sitting, Cuff Size: Adult)   Ht 157.5 cm (62\")   Wt 51 kg (112 lb 6.4 oz)   LMP  (LMP Unknown)   Breastfeeding No   BMI 20.56 kg/m²     General:  well developed; well nourished  no acute distress  mentation appropriate   Lungs:  breathing is unlabored   Heart:  Not performed.     Lab Review   No data reviewed    Imaging   No data reviewed        Assessment   OCP surveillance      Plan   Continue Slynd. Does not need refills at this time.   The importance of keeping all planned follow-up and taking all medications as prescribed was emphasized.  Follow up for annual exam or sooner PRN      No orders of the defined types were placed in this encounter.         This note was electronically signed.    April Lanier MD  May 29, 2025     "